# Patient Record
Sex: FEMALE | Race: WHITE | Employment: OTHER | URBAN - METROPOLITAN AREA
[De-identification: names, ages, dates, MRNs, and addresses within clinical notes are randomized per-mention and may not be internally consistent; named-entity substitution may affect disease eponyms.]

---

## 2024-04-19 ENCOUNTER — OFFICE VISIT (OUTPATIENT)
Dept: BARIATRICS/WEIGHT MGMT | Age: 54
End: 2024-04-19
Payer: COMMERCIAL

## 2024-04-19 VITALS
SYSTOLIC BLOOD PRESSURE: 122 MMHG | HEIGHT: 61 IN | HEART RATE: 71 BPM | OXYGEN SATURATION: 96 % | BODY MASS INDEX: 54.71 KG/M2 | RESPIRATION RATE: 16 BRPM | WEIGHT: 289.8 LBS | DIASTOLIC BLOOD PRESSURE: 78 MMHG

## 2024-04-19 DIAGNOSIS — R73.03 PREDIABETES: ICD-10-CM

## 2024-04-19 DIAGNOSIS — E66.01 MORBID OBESITY WITH BMI OF 50.0-59.9, ADULT (HCC): ICD-10-CM

## 2024-04-19 DIAGNOSIS — K21.9 CHRONIC GERD: ICD-10-CM

## 2024-04-19 DIAGNOSIS — Z01.818 PREOPERATIVE CLEARANCE: Primary | ICD-10-CM

## 2024-04-19 DIAGNOSIS — E78.2 MIXED HYPERLIPIDEMIA: ICD-10-CM

## 2024-04-19 DIAGNOSIS — G47.33 OBSTRUCTIVE SLEEP APNEA: ICD-10-CM

## 2024-04-19 PROCEDURE — 3017F COLORECTAL CA SCREEN DOC REV: CPT | Performed by: SURGERY

## 2024-04-19 PROCEDURE — 99205 OFFICE O/P NEW HI 60 MIN: CPT | Performed by: SURGERY

## 2024-04-19 PROCEDURE — 1036F TOBACCO NON-USER: CPT | Performed by: SURGERY

## 2024-04-19 PROCEDURE — G8427 DOCREV CUR MEDS BY ELIG CLIN: HCPCS | Performed by: SURGERY

## 2024-04-19 PROCEDURE — G8417 CALC BMI ABV UP PARAM F/U: HCPCS | Performed by: SURGERY

## 2024-04-19 RX ORDER — TRAMADOL HYDROCHLORIDE 50 MG/1
50 TABLET ORAL EVERY 6 HOURS PRN
COMMUNITY

## 2024-04-19 RX ORDER — ALBUTEROL SULFATE 90 UG/1
2 AEROSOL, METERED RESPIRATORY (INHALATION) EVERY 6 HOURS PRN
COMMUNITY
Start: 2024-02-29 | End: 2024-08-27

## 2024-04-19 RX ORDER — ASPIRIN 81 MG/1
81 TABLET, CHEWABLE ORAL 2 TIMES DAILY
COMMUNITY
Start: 2024-04-03 | End: 2024-05-03

## 2024-04-19 RX ORDER — ASCORBIC ACID 500 MG
500 TABLET ORAL 2 TIMES DAILY
COMMUNITY
Start: 2024-04-03

## 2024-04-19 RX ORDER — IBUPROFEN 800 MG/1
800 TABLET ORAL EVERY 8 HOURS PRN
COMMUNITY
Start: 2024-04-17

## 2024-04-19 RX ORDER — ATORVASTATIN CALCIUM 10 MG/1
10 TABLET, FILM COATED ORAL NIGHTLY
COMMUNITY
Start: 2024-02-29 | End: 2024-08-27

## 2024-04-19 RX ORDER — IBUPROFEN 200 MG
200 TABLET ORAL EVERY 6 HOURS PRN
COMMUNITY

## 2024-04-19 RX ORDER — ACETAMINOPHEN 500 MG
500 TABLET ORAL EVERY 4 HOURS PRN
COMMUNITY
Start: 2024-04-03

## 2024-04-19 RX ORDER — PREDNISONE 10 MG/1
10 TABLET ORAL DAILY
COMMUNITY
Start: 2024-04-03

## 2024-04-19 RX ORDER — POLYETHYLENE GLYCOL 3350, SODIUM CHLORIDE, SODIUM BICARBONATE, POTASSIUM CHLORIDE 420; 11.2; 5.72; 1.48 G/4L; G/4L; G/4L; G/4L
4000 POWDER, FOR SOLUTION ORAL ONCE
COMMUNITY
Start: 2024-03-05

## 2024-04-19 RX ORDER — METHOCARBAMOL 750 MG/1
750 TABLET, FILM COATED ORAL 2 TIMES DAILY PRN
COMMUNITY
Start: 2024-04-03

## 2024-04-19 RX ORDER — GABAPENTIN 300 MG/1
300 TABLET ORAL EVERY 8 HOURS
COMMUNITY
Start: 2024-04-03 | End: 2024-04-24

## 2024-04-19 NOTE — PATIENT INSTRUCTIONS
-Plan for Laparoscopic sleeve gastrectomy      Pre-operative work up Ordered:    - F/U in 4 weeks.   - Nutrition Labs.   - Protein Shake Trial.  - Psych Evaluation.   - Cardiac Clearance.  - Sleep Study & CPAP Compliance. (PENDING)  - EGD (endoscopy to check your stomach).  - Support Group Attendance.   - Obtain letter of medical necessity (PCP Letter).   - Quit Smoking,  Alcohol, Caffeine and Carbonated Drinks  - Obtain records for Weight History 2 yrs.   - Start Regular Exercise and track your activities.   - Start Tracking your food Intake and follow dietary guidelines.   - Avoid Pregnancy for 2 yrs from date of surgery. (for female patients in childbearing age)          Patient advised that its their responsibility to follow up for studies, referrals and/or labs ordered today.

## 2024-04-22 ENCOUNTER — TELEPHONE (OUTPATIENT)
Dept: CARDIOLOGY CLINIC | Age: 54
End: 2024-04-22

## 2024-04-22 NOTE — TELEPHONE ENCOUNTER
Patient was referred by Dr. Jameson Houston to the [facility]  location with Dr. Rose .  Patient has been scheduled for 05/03 at 10:30am (am/pm).  Patient verbalized understanding of appointment instructions.  Call complete.

## 2024-05-01 NOTE — PROGRESS NOTES
Authorizing Provider   atorvastatin (LIPITOR) 10 MG tablet Take 1 tablet by mouth nightly 2/29/24 8/27/24 Yes Reuben Luu MD   aspirin 81 MG chewable tablet Take 1 tablet by mouth 2 times daily 4/3/24 5/3/24 Yes Reuben Luu MD   ascorbic acid (VITAMIN C) 500 MG tablet Take 1 tablet by mouth 2 times daily 4/3/24  Yes Reuben Luu MD   albuterol sulfate HFA (PROVENTIL;VENTOLIN;PROAIR) 108 (90 Base) MCG/ACT inhaler Inhale 2 puffs into the lungs every 6 hours as needed for Wheezing 2/29/24 8/27/24 Yes Reuben Luu MD   acetaminophen (TYLENOL) 500 MG tablet Take 1 tablet by mouth every 4 hours as needed for Pain 4/3/24  Yes Reuben Luu MD   Gabapentin, Once-Daily, 300 MG TABS Take 300 mg by mouth in the morning and 300 mg at noon and 300 mg in the evening. 4/3/24 5/3/24 Yes Reuben Luu MD   ibuprofen (ADVIL;MOTRIN) 200 MG tablet Take 1 tablet by mouth every 6 hours as needed for Pain   Yes Reuben Luu MD   methocarbamol (ROBAXIN) 750 MG tablet Take 1 tablet by mouth 2 times daily as needed 4/3/24  Yes Reuben Luu MD   ibuprofen (ADVIL;MOTRIN) 800 MG tablet Take 1 tablet by mouth every 8 hours as needed for Pain 4/17/24  Yes Reuben Luu MD   predniSONE (DELTASONE) 10 MG tablet Take 1 tablet by mouth daily 4/3/24  Yes Reuben Luu MD   traMADol (ULTRAM) 50 MG tablet Take 1 tablet by mouth every 6 hours as needed for Pain.   Yes Reuben Luu MD   polyethylene glycol-electrolytes (NULYTELY) 420 g solution Take 4,000 mLs by mouth once  Patient not taking: Reported on 5/3/2024 3/5/24   Reuben Luu MD        Current Medications:  Current Outpatient Medications   Medication Sig Dispense Refill    atorvastatin (LIPITOR) 10 MG tablet Take 1 tablet by mouth nightly      aspirin 81 MG chewable tablet Take 1 tablet by mouth 2 times daily      ascorbic acid (VITAMIN C) 500 MG tablet Take 1 tablet by mouth 2 times daily

## 2024-05-03 ENCOUNTER — OFFICE VISIT (OUTPATIENT)
Dept: CARDIOLOGY CLINIC | Age: 54
End: 2024-05-03
Payer: COMMERCIAL

## 2024-05-03 VITALS
OXYGEN SATURATION: 95 % | HEART RATE: 74 BPM | BODY MASS INDEX: 53.52 KG/M2 | DIASTOLIC BLOOD PRESSURE: 62 MMHG | HEIGHT: 61 IN | WEIGHT: 283.5 LBS | SYSTOLIC BLOOD PRESSURE: 100 MMHG

## 2024-05-03 DIAGNOSIS — Z01.818 PREOPERATIVE CLEARANCE: Primary | ICD-10-CM

## 2024-05-03 DIAGNOSIS — R06.02 SOB (SHORTNESS OF BREATH): ICD-10-CM

## 2024-05-03 PROCEDURE — 99204 OFFICE O/P NEW MOD 45 MIN: CPT | Performed by: INTERNAL MEDICINE

## 2024-05-03 PROCEDURE — 93000 ELECTROCARDIOGRAM COMPLETE: CPT | Performed by: INTERNAL MEDICINE

## 2024-05-03 NOTE — PATIENT INSTRUCTIONS
Schedule stress test soon (medicated stress test)    No follow up needed at this time. We will call you will the results of your testing and if further follow up needed we will schedule then

## 2024-05-19 PROBLEM — Z01.818 PREOPERATIVE CLEARANCE: Status: RESOLVED | Noted: 2024-04-19 | Resolved: 2024-05-19

## 2024-05-20 ENCOUNTER — HOSPITAL ENCOUNTER (OUTPATIENT)
Age: 54
Discharge: HOME | End: 2024-05-22
Attending: INTERNAL MEDICINE
Payer: COMMERCIAL

## 2024-05-20 VITALS
SYSTOLIC BLOOD PRESSURE: 112 MMHG | HEART RATE: 70 BPM | WEIGHT: 280 LBS | HEIGHT: 63 IN | BODY MASS INDEX: 49.61 KG/M2 | DIASTOLIC BLOOD PRESSURE: 64 MMHG

## 2024-05-20 VITALS — BODY MASS INDEX: 49.61 KG/M2 | HEIGHT: 63 IN | WEIGHT: 280 LBS

## 2024-05-20 DIAGNOSIS — R06.02 SOB (SHORTNESS OF BREATH): ICD-10-CM

## 2024-05-20 DIAGNOSIS — Z01.818 PREOPERATIVE CLEARANCE: ICD-10-CM

## 2024-05-20 LAB
ECHO BSA: 2.38 M2
NUC STRESS EJECTION FRACTION: 55 %
NUC STRESS LV EDV: 91 ML (ref 56–104)
NUC STRESS LV ESV: 41 ML (ref 19–49)
NUC STRESS LV MASS: 120 G
STRESS BASELINE DIAS BP: 64 MMHG
STRESS BASELINE HR: 71 BPM
STRESS BASELINE ST DEPRESSION: 0 MM
STRESS BASELINE SYS BP: 112 MMHG
STRESS ESTIMATED WORKLOAD: 1 METS
STRESS EXERCISE DUR MIN: 4 MIN
STRESS EXERCISE DUR SEC: 0 SEC
STRESS O2 SAT REST: 95 %
STRESS PEAK DIAS BP: 57 MMHG
STRESS PEAK SYS BP: 113 MMHG
STRESS PERCENT HR ACHIEVED: 51 %
STRESS POST PEAK HR: 87 BPM
STRESS RATE PRESSURE PRODUCT: 9831 BPM*MMHG
STRESS ST DEPRESSION: 0 MM
STRESS TARGET HR: 169 BPM
TID: 1.04

## 2024-05-20 PROCEDURE — 93016 CV STRESS TEST SUPVJ ONLY: CPT | Performed by: INTERNAL MEDICINE

## 2024-05-20 PROCEDURE — 78452 HT MUSCLE IMAGE SPECT MULT: CPT

## 2024-05-20 PROCEDURE — 6360000002 HC RX W HCPCS: Performed by: INTERNAL MEDICINE

## 2024-05-20 PROCEDURE — 78452 HT MUSCLE IMAGE SPECT MULT: CPT | Performed by: INTERNAL MEDICINE

## 2024-05-20 PROCEDURE — 93017 CV STRESS TEST TRACING ONLY: CPT

## 2024-05-20 PROCEDURE — 93018 CV STRESS TEST I&R ONLY: CPT | Performed by: INTERNAL MEDICINE

## 2024-05-20 RX ORDER — REGADENOSON 0.08 MG/ML
0.4 INJECTION, SOLUTION INTRAVENOUS
Status: COMPLETED | OUTPATIENT
Start: 2024-05-20 | End: 2024-05-20

## 2024-05-20 RX ADMIN — REGADENOSON 0.4 MG: 0.08 INJECTION, SOLUTION INTRAVENOUS at 14:18

## 2024-05-28 ENCOUNTER — TELEPHONE (OUTPATIENT)
Dept: CARDIOLOGY CLINIC | Age: 54
End: 2024-05-28

## 2024-05-28 NOTE — TELEPHONE ENCOUNTER
----- Message from Jose D Rose MD sent at 5/26/2024 10:14 AM EDT -----  Please inform patient of normal stress test result

## 2024-07-16 ENCOUNTER — OFFICE VISIT (OUTPATIENT)
Dept: BARIATRICS/WEIGHT MGMT | Age: 54
End: 2024-07-16
Payer: COMMERCIAL

## 2024-07-16 VITALS
WEIGHT: 293 LBS | HEART RATE: 70 BPM | HEIGHT: 61 IN | DIASTOLIC BLOOD PRESSURE: 62 MMHG | BODY MASS INDEX: 55.32 KG/M2 | RESPIRATION RATE: 16 BRPM | OXYGEN SATURATION: 98 % | SYSTOLIC BLOOD PRESSURE: 114 MMHG

## 2024-07-16 DIAGNOSIS — E78.2 MIXED HYPERLIPIDEMIA: ICD-10-CM

## 2024-07-16 DIAGNOSIS — R73.03 PREDIABETES: ICD-10-CM

## 2024-07-16 DIAGNOSIS — G47.33 OBSTRUCTIVE SLEEP APNEA: ICD-10-CM

## 2024-07-16 DIAGNOSIS — K21.9 CHRONIC GERD: ICD-10-CM

## 2024-07-16 DIAGNOSIS — E66.01 MORBID OBESITY WITH BMI OF 50.0-59.9, ADULT (HCC): Primary | ICD-10-CM

## 2024-07-16 PROCEDURE — G8417 CALC BMI ABV UP PARAM F/U: HCPCS | Performed by: SURGERY

## 2024-07-16 PROCEDURE — 3017F COLORECTAL CA SCREEN DOC REV: CPT | Performed by: SURGERY

## 2024-07-16 PROCEDURE — 99214 OFFICE O/P EST MOD 30 MIN: CPT | Performed by: SURGERY

## 2024-07-16 PROCEDURE — 1036F TOBACCO NON-USER: CPT | Performed by: SURGERY

## 2024-07-16 PROCEDURE — G8427 DOCREV CUR MEDS BY ELIG CLIN: HCPCS | Performed by: SURGERY

## 2024-07-16 NOTE — PROGRESS NOTES
[facility]   [facility]  Jameson Houston MD, FACS, [address], [address]    [facility] [address] .  Phone: [phone]  Fax: [phone]          Chief Complaint   Patient presents with    Obesity     2nd pre-surg         HPI:     Tiff Baez is a very pleasant 53 y.o. female with Body mass index is 55.74 kg/m². / Chronic Obesity.     Tiff has been struggling for several years now with obesity. Tiff feels the weight is an obstacle to achieve and perform things in daily living as well risk on health.     Patient  is very determined to lose weight and be healthy, and is working towards  surgical weight loss to achieve this goal. Pre-operative clearance and work up pending. Working hard to keep good dietary habits as well level of activity.  Patient denies any nausea, vomiting, fevers, chills, shortness of breath, chest pain, cough, constipation or difficulty urinating.    Pain Assessment   Denies any abdominal pain       Past Medical History:   Diagnosis Date    Arthritis     Back pain     COPD (chronic obstructive pulmonary disease) (HCC)     Hyperlipidemia      Past Surgical History:   Procedure Laterality Date    TOTAL KNEE ARTHROPLASTY Bilateral 2024     Family History   Problem Relation Age of Onset    Arthritis Mother     Stroke Mother     Diabetes Father     Elevated Lipids Father     Hypertension Father      Social History     Tobacco Use    Smoking status: Former     Current packs/day: 0.00     Average packs/day: 0.5 packs/day for 37.3 years (18.6 ttl pk-yrs)     Types: Cigarettes     Start date:      Quit date: 2024     Years since quittin.2    Smokeless tobacco: Never   Substance Use Topics    Alcohol use: Not on file     I counseled the patient on the importance of not smoking and risks of ETOH.   No Known Allergies  Vitals:    24 1117   BP: 114/62   Pulse: 70   Resp: 16   SpO2: 98%   Weight: 133.8 kg (295 lb)   Height: 1.549 m

## 2024-07-16 NOTE — PROGRESS NOTES
Tiff Baez gained 5.2 lbs over past 3 months. Pt reports she currently lives in a sober community where food is all brought in and everyone cooks so has little control over what is offered. However, pt is about to move into her own apartment. Pt reports she is also working with a Dietitian at her doctors office.     Breakfast: cornflakes with dried strawberries OR oatmeal (made with 2% milk) with 2 slices of toast     Snack: nothing     Lunch: bologna sandwich with cheese on wheat with chips / CC     Snack: nothing     Dinner: chicken with veggies (mixed veggies) and mashed potatoes/fried potatoes     Snack: another bowl of cereal OR yogurt     Is pt consuming smaller portions? Patient     Is pt consuming at least 64 oz of fluids per day? Yes     Is pt consuming carbonated, caffeinated, or sugary beverages? Yes - Drinks water, water flavorings, gatorade (regular or zero), caffeinated coffee and splenda, no sweet tea since last visit, eliminated soda     Has pt sampled Unjury and/or Nectar protein? Not yet, discussed today, reports no income so pt is not sure how she can afford - discussed expectations of cost for preop class and first few weeks after sx      Has patient attended a support group? Not yet, discussed today     Exercise: Pt had knee replacement in April 2024 and currently using walker and completed physical therapy     Plan/Recommendations:   Decrease CHO intake and focus on protein with fruit/veggies   Decrease portions   Focus on protein based snacks   Try protein powder   Attend SG     Handouts: none     Rebeca Murray, RD, LD

## 2024-07-26 ENCOUNTER — PREP FOR PROCEDURE (OUTPATIENT)
Dept: BARIATRICS/WEIGHT MGMT | Age: 54
End: 2024-07-26

## 2024-08-12 RX ORDER — SODIUM CHLORIDE 0.9 % (FLUSH) 0.9 %
5-40 SYRINGE (ML) INJECTION PRN
Status: CANCELLED | OUTPATIENT
Start: 2024-08-12

## 2024-08-12 RX ORDER — SODIUM CHLORIDE 9 MG/ML
25 INJECTION, SOLUTION INTRAVENOUS PRN
Status: CANCELLED | OUTPATIENT
Start: 2024-08-12

## 2024-08-12 RX ORDER — SODIUM CHLORIDE 0.9 % (FLUSH) 0.9 %
5-40 SYRINGE (ML) INJECTION EVERY 12 HOURS SCHEDULED
Status: CANCELLED | OUTPATIENT
Start: 2024-08-12

## 2024-08-27 ENCOUNTER — OFFICE VISIT (OUTPATIENT)
Dept: BARIATRICS/WEIGHT MGMT | Age: 54
End: 2024-08-27
Payer: COMMERCIAL

## 2024-08-27 ENCOUNTER — INITIAL CONSULT (OUTPATIENT)
Dept: BARIATRICS/WEIGHT MGMT | Age: 54
End: 2024-08-27
Payer: COMMERCIAL

## 2024-08-27 VITALS
BODY MASS INDEX: 55.32 KG/M2 | HEIGHT: 61 IN | OXYGEN SATURATION: 94 % | HEART RATE: 68 BPM | DIASTOLIC BLOOD PRESSURE: 75 MMHG | WEIGHT: 293 LBS | SYSTOLIC BLOOD PRESSURE: 119 MMHG | RESPIRATION RATE: 17 BRPM

## 2024-08-27 DIAGNOSIS — F33.41 MDD (MAJOR DEPRESSIVE DISORDER), RECURRENT, IN PARTIAL REMISSION (HCC): Primary | ICD-10-CM

## 2024-08-27 DIAGNOSIS — E66.01 MORBID OBESITY WITH BMI OF 50.0-59.9, ADULT (HCC): Primary | ICD-10-CM

## 2024-08-27 DIAGNOSIS — F10.21 ALCOHOL DEPENDENCE, IN REMISSION (HCC): ICD-10-CM

## 2024-08-27 DIAGNOSIS — R73.03 PREDIABETES: ICD-10-CM

## 2024-08-27 DIAGNOSIS — E66.01 MORBID OBESITY WITH BMI OF 50.0-59.9, ADULT (HCC): ICD-10-CM

## 2024-08-27 DIAGNOSIS — K21.9 CHRONIC GERD: ICD-10-CM

## 2024-08-27 DIAGNOSIS — G47.33 OBSTRUCTIVE SLEEP APNEA: ICD-10-CM

## 2024-08-27 DIAGNOSIS — E78.2 MIXED HYPERLIPIDEMIA: ICD-10-CM

## 2024-08-27 PROCEDURE — G8417 CALC BMI ABV UP PARAM F/U: HCPCS | Performed by: SURGERY

## 2024-08-27 PROCEDURE — 3017F COLORECTAL CA SCREEN DOC REV: CPT | Performed by: SURGERY

## 2024-08-27 PROCEDURE — 99214 OFFICE O/P EST MOD 30 MIN: CPT | Performed by: SURGERY

## 2024-08-27 PROCEDURE — 90791 PSYCH DIAGNOSTIC EVALUATION: CPT | Performed by: PSYCHOLOGIST

## 2024-08-27 PROCEDURE — 1036F TOBACCO NON-USER: CPT | Performed by: SURGERY

## 2024-08-27 PROCEDURE — G8427 DOCREV CUR MEDS BY ELIG CLIN: HCPCS | Performed by: SURGERY

## 2024-08-27 PROCEDURE — 96130 PSYCL TST EVAL PHYS/QHP 1ST: CPT | Performed by: PSYCHOLOGIST

## 2024-08-27 PROCEDURE — 1036F TOBACCO NON-USER: CPT | Performed by: PSYCHOLOGIST

## 2024-08-27 PROCEDURE — 96136 PSYCL/NRPSYC TST PHY/QHP 1ST: CPT | Performed by: PSYCHOLOGIST

## 2024-08-27 RX ORDER — BUDESONIDE AND FORMOTEROL FUMARATE DIHYDRATE 160; 4.5 UG/1; UG/1
2 AEROSOL RESPIRATORY (INHALATION) 2 TIMES DAILY
COMMUNITY

## 2024-08-27 RX ORDER — ESCITALOPRAM OXALATE 10 MG/1
TABLET ORAL
COMMUNITY
Start: 2024-08-19

## 2024-08-27 NOTE — PROGRESS NOTES
[facility]   [facility]  Jameson Houston MD, FACS, [address], [address]    [facility] [address] .  Phone: [phone]  Fax: [phone]          Chief Complaint   Patient presents with    Bariatric, Initial Visit     3rd pre surg         HPI:     Tiff Baez is a very pleasant 53 y.o. female with Body mass index is 56.31 kg/m². / Chronic Obesity.     Tiff has been struggling for several years now with obesity. Tiff feels the weight is an obstacle to achieve and perform things in daily living as well risk on health.     Patient  is very determined to lose weight and be healthy, and is working towards  surgical weight loss to achieve this goal. Pre-operative clearance and work up pending. Working hard to keep good dietary habits as well level of activity.  Patient denies any nausea, vomiting, fevers, chills, shortness of breath, chest pain, cough, constipation or difficulty urinating.    Pain Assessment   Denies any abdominal pain       Past Medical History:   Diagnosis Date    Arthritis     Back pain     COPD (chronic obstructive pulmonary disease) (HCC)     Hyperlipidemia      Past Surgical History:   Procedure Laterality Date    TOTAL KNEE ARTHROPLASTY Bilateral 2024     Family History   Problem Relation Age of Onset    Arthritis Mother     Stroke Mother     Diabetes Father     Elevated Lipids Father     Hypertension Father      Social History     Tobacco Use    Smoking status: Former     Current packs/day: 0.00     Average packs/day: 0.5 packs/day for 37.3 years (18.6 ttl pk-yrs)     Types: Cigarettes     Start date:      Quit date: 2024     Years since quittin.3    Smokeless tobacco: Never   Substance Use Topics    Alcohol use: Not on file     I counseled the patient on the importance of not smoking and risks of ETOH.   No Known Allergies  Vitals:    24 1046   BP: 119/75   Site: Left Upper Arm   Position: Sitting   Pulse: 68   Resp:

## 2024-08-27 NOTE — PROGRESS NOTES
Tiff Baez gained 3 lbs over past 5 weeks. Pt just moved into her own apartment. Pt reports she is not sleeping good at night - gets tired and takes 1-4 naps during the day. Pt reported she stopped smoking but then started vaping. Pt typically goes to the grocery store 1-2 x month - reports still very limited with finances.     Is pt eating at least 4 times everyday? Eating 3 x day   B - pork sausage and 2 HB eggs   *takes a nap around 12-1 and sleeps for 3 hours  L - bologna sandwich with some chips OR CC   *back to bed and wakes up around 9-10 p.m. and eats another bologna sandwich and chips     Is pt eating a lean protein source with all meals and snacks? Always having protein but not lean     Has pt decreased their portions using the plate method? Portions could decrease further     Is pt choosing low fat/sugar free options? No     Is pt drinking at least 64 oz of clear liquids everyday? Yes      Has pt stopped drinking carbonation, caffeinated, and sugar sweetened beverages? Drinks water, water flavorings, decaf coffee and splenda, eliminated soda and gatorade and sweet tea     Has pt sampled Unjury and/or Nectar protein? Not yet, reports still no income so pt is not sure how she can afford - reviewed price of samples would be only $5 for 2 packets, but that preop class would be a more expensive cost     Has pt attended a support group? Not yet, discussed today     Participating in intentional exercise? Pt limited as she has osteoarthritis and breathing issues. Pt had knee replacement in April 2024 and currently using walker - but states her knee is improving.     Plan/Recommendations:   Eat 4 x day   Switch to lower fat fat food options - avoid sausage / bologna and stick with leaner options   Avoid chips and increase veggie intake   Attend SG   Try protein powder   Discussed savvy shopping techniques     Handouts: presurgical diet eating guide, budget friendly foods     Rebeca Murray RD, LD     Bilobed Flap Text: The defect edges were debeveled with a #15c scalpel blade.  Given the location of the defect and the proximity to free margins a bilobe flap was deemed most appropriate.  Using a sterile surgical marker, an appropriate bilobe flap drawn around the defect.    The area thus outlined was incised deep to adipose tissue with a #15 scalpel blade.  The skin margins were undermined to an appropriate distance in all directions utilizing iris scissors.

## 2024-08-27 NOTE — PROGRESS NOTES
Tiff Baez presented for her presurgical psychological evaluation on 08/27/2024. The patient is pursuing bariatric surgery secondary to a medical diagnosis of morbid obesity. The evaluation consisted of a clinical interview, the Eating Habits Checklist, the Binge Eating Disorder Screener - 7 (BEDS-7), and the Symptom Hugdmlfky-25-A (SCL-90-R) administered by the provider.    Based on the evaluation, Tiff Baez is considered to be an appropriate candidate for bariatric surgery from a psychological standpoint, provided she maintains adequate management of her depression, and demonstrates the ability to effectively implement and sustain presurgical dietary recommendations. She acknowledges a longstanding history of recurrent, intermittent depression that was largely untreated until recently. She has taken Lexapro prescribed by her family physician for the past 6-9 months with good therapeutic benefit. She states her depressive symptoms are largely in remission with the medication, though she continues to struggle with some grief from her daughter's death in 2022. She notes she participated in counseling through [facility] for several months earlier this year. She reports no other history of psychological intervention. She states she was prescribed Zoloft 10-11 years ago, but discontinued the medication after a month or two due to side effects. She does not recall ever having taken any other psychotropic medications other than the Lexapro she is currently prescribed. She denies a history of severe depression, including suicidal ideation or suicide attempts. She has never been hospitalized psychiatrically. She acknowledges a history of nicotine and alcohol dependence. She states she smoked 1/2 ppd for 30+ years prior to quitting in April 2024. She is not yet completely nicotine-free, as she continues to vape \"here and there.\" She states she has been sober for three years. She notes she quit drinking without  psychological standpoint, provided she maintains adequate management of her depression, and demonstrates the ability to effectively implement and sustain presurgical dietary recommendations. She was provided with the following interactive feedback: She was encouraged to participate in support group activities through [facility], and to consult with our staff and her PCP should she experience any significant post-surgical mood changes or have difficulty modifying her eating behaviors. Feel free to consult with me as needed with any further questions regarding this evaluation.    Provider spent 25 minutes in test administration services. Provider spent 65 minutes in test evaluation services on 08/27/2024.

## 2024-09-11 NOTE — PROGRESS NOTES
[facility]   [facility]    Subjective:      Patient ID: Tiff Baez is a 54 y.o. female    HPI    The patient is here for their bariatric surgery presurgical visit for future weight loss. She has made several attempts at weight loss in the past without success and now wishes to pursue bariatric surgery. She is working to change her dietary behaviors and lose weight to improve comorbid conditions such as prediabetes, hyperlipidemia, obstructive sleep apnea and GERD.    Tiff Baez is a 54 y.o. female with Body mass index is 54.98 kg/m².    Past Medical History:   Diagnosis Date    Arthritis     Back pain     COPD (chronic obstructive pulmonary disease) ([facility])     Hyperlipidemia      Past Surgical History:   Procedure Laterality Date    TOTAL KNEE ARTHROPLASTY Bilateral 2024     Family History   Problem Relation Age of Onset    Arthritis Mother     Stroke Mother     Diabetes Father     Elevated Lipids Father     Hypertension Father      Social History     Tobacco Use    Smoking status: Former     Current packs/day: 0.00     Average packs/day: 0.5 packs/day for 37.3 years (18.6 ttl pk-yrs)     Types: Cigarettes     Start date:      Quit date: 2024     Years since quittin.4    Smokeless tobacco: Never   Substance Use Topics    Alcohol use: Not on file     I counseled the patient on the importance of not smoking and risks of ETOH.   No Known Allergies  Vitals:    24 1410   BP: 115/73   Pulse: 78   Weight: 132 kg (291 lb)   Height: 1.549 m (5' 1\")     Body mass index is 54.98 kg/m².    Current Outpatient Medications:     escitalopram (LEXAPRO) 10 MG tablet, , Disp: , Rfl:     budesonide-formoterol (SYMBICORT) 160-4.5 MCG/ACT AERO, Inhale 2 puffs into the lungs 2 times daily, Disp: , Rfl:     ascorbic acid (VITAMIN C) 500 MG tablet, Take 1 tablet by mouth 2 times daily, Disp: , Rfl:     acetaminophen (TYLENOL) 500 MG tablet, Take 1 tablet by mouth every 4 hours as

## 2024-09-30 ENCOUNTER — PREP FOR PROCEDURE (OUTPATIENT)
Dept: BARIATRICS/WEIGHT MGMT | Age: 54
End: 2024-09-30

## 2024-09-30 ENCOUNTER — OFFICE VISIT (OUTPATIENT)
Dept: BARIATRICS/WEIGHT MGMT | Age: 54
End: 2024-09-30
Payer: COMMERCIAL

## 2024-09-30 VITALS
BODY MASS INDEX: 54.94 KG/M2 | HEART RATE: 78 BPM | WEIGHT: 291 LBS | DIASTOLIC BLOOD PRESSURE: 73 MMHG | SYSTOLIC BLOOD PRESSURE: 115 MMHG | HEIGHT: 61 IN

## 2024-09-30 DIAGNOSIS — K21.9 CHRONIC GERD: Primary | ICD-10-CM

## 2024-09-30 DIAGNOSIS — G47.33 OBSTRUCTIVE SLEEP APNEA: ICD-10-CM

## 2024-09-30 DIAGNOSIS — E66.01 MORBID OBESITY WITH BMI OF 50.0-59.9, ADULT: ICD-10-CM

## 2024-09-30 DIAGNOSIS — E78.2 MIXED HYPERLIPIDEMIA: ICD-10-CM

## 2024-09-30 DIAGNOSIS — R73.03 PREDIABETES: ICD-10-CM

## 2024-09-30 PROCEDURE — 3017F COLORECTAL CA SCREEN DOC REV: CPT | Performed by: NURSE PRACTITIONER

## 2024-09-30 PROCEDURE — G8427 DOCREV CUR MEDS BY ELIG CLIN: HCPCS | Performed by: NURSE PRACTITIONER

## 2024-09-30 PROCEDURE — 1036F TOBACCO NON-USER: CPT | Performed by: NURSE PRACTITIONER

## 2024-09-30 PROCEDURE — 99214 OFFICE O/P EST MOD 30 MIN: CPT | Performed by: NURSE PRACTITIONER

## 2024-09-30 PROCEDURE — G2211 COMPLEX E/M VISIT ADD ON: HCPCS | Performed by: NURSE PRACTITIONER

## 2024-09-30 PROCEDURE — G8417 CALC BMI ABV UP PARAM F/U: HCPCS | Performed by: NURSE PRACTITIONER

## 2024-09-30 RX ORDER — SODIUM CHLORIDE 9 MG/ML
25 INJECTION, SOLUTION INTRAVENOUS PRN
Status: CANCELLED | OUTPATIENT
Start: 2024-09-30

## 2024-09-30 RX ORDER — SODIUM CHLORIDE 0.9 % (FLUSH) 0.9 %
5-40 SYRINGE (ML) INJECTION EVERY 12 HOURS SCHEDULED
Status: CANCELLED | OUTPATIENT
Start: 2024-09-30

## 2024-09-30 RX ORDER — SODIUM CHLORIDE 0.9 % (FLUSH) 0.9 %
5-40 SYRINGE (ML) INJECTION PRN
Status: CANCELLED | OUTPATIENT
Start: 2024-09-30

## 2024-09-30 NOTE — PATIENT INSTRUCTIONS
in the future it is important to understand, as a bariatric surgery patient, there is a risk of it negatively affecting your weight loss goals and it can pose a risk for addiction.    You should be reducing added fat and sugar in your diet.  Frying foods adds too much fat and calories, but you could use an air fryer as it requires significantly less oil. Baking, broiling, or grilling meats add flavor without unhealthy fats. Using cooking oil spray and spray butter products are also healthy options that will aid in your weight loss. Foods high in added sugars are often also high in calories and low in nutrients.      If you are a smoker or use e-cigarettes/vaping, you must eliminate.  You must be nicotine free and/or not vaping for at least 8 weeks before your surgery can be scheduled. You will be screened for nicotine through lab work.     Eating habits after surgery need to be a long-term change. Eating habits are often so ingrained that it can be difficult to change. It is important to practice new eating habits prior to surgery to mentally prepare yourself for the challenge ahead. Also, remember that overall health, age, and genetics make each person's weight loss progress different. Do not compare your progress (pre- or post-operatively), the amount you eat, or your exercise to other patients.      In addition, it is the responsibility of the patient to schedule and follow up on labs and tests completed during the pre-surgical period. Results will be reviewed at each visit.    **IT IS IMPORTANT TO KNOW THAT YOU MUST COMPLETE ALL REQUIRED DIETARY CHANGES, TESTS, CLEARANCES AND ACTIVITIES BEFORE A SURGERY DATE CAN BE GIVEN. IF YOU HAVE NOT MET ANY OF THESE REQUIREMENTS THEN YOU WILL NOT BE GIVEN A SURGERY DATE UNTIL THEY HAVE BEEN MET TO OUR SATISFACTION. THIS IS NOT ONLY DONE TO HELP YOU BE SUCCESSFUL AFTER SURGERY, BUT TO BE SAFE AS WELL.**    Patient received dietary handouts and education.

## 2024-09-30 NOTE — PROGRESS NOTES
Tiff Baez lost 7 lbs over past month. Pt reports she has really focused this month on utilizing handouts provided and has been making better choices / working on portions / reducing snacking.     Is pt eating at least 4 times everyday? Eating 3 x day     Is pt eating a lean protein source with all meals and snacks? Yes   B - scrambled eggs with some egg whites with turkey sausage with some OJ   L - CC sometimes with yogurt   D - baked chicken with corn/green beans     Has pt decreased their portions using the plate method? Pt feels portions have improved     Is pt choosing low fat/sugar free options? Yes     Is pt drinking at least 64 oz of clear liquids everyday? Yes     Has pt stopped drinking carbonation, caffeinated, and sugar sweetened beverages? No - drinks light OJ (80 calorie), water, noncaffeinated tea with splenda, occasional decaf coffee with splenda     Has pt sampled Unjury and/or Nectar protein? Not yet, discussed today     Has pt attended a support group? Not yet, discussed today - signed up for 11/7 group     Participating in intentional exercise? Yes - pt limited as she has osteoarthritis and breathing issues. Has been doing bed exercises and leg lifts, sometimes takes extra laps around the living room. No longer as reliant on walker and is using her cane more.     Plan/Recommendations:   Include at least one protein based snack so she is eating at least 4 x day   Avoid lower calorie OJ / stick to diet (5 calorie) juice options   Try protein powder   Attend SG 11/7/24 - pt signed up today     Handouts: none     Rebeca Murray, RD, LD

## 2024-10-04 NOTE — PROGRESS NOTES
Patient Reached:  Yes_X__   No___    Voicemail Instructions Given: Yes___  No___  # Called:       Date: 10/ 11 /2024      *Arrival Time Per Office      Location: [address] [place], [place]       -Please follow a clear liquid diet the entire day before the procedure.    -No liquids after midnight. Including no gum, candy or mints.    -Do not take any medications the day of the procedure.    -Follow all instructions that the office has given you.    -You will need a responsible adult to stay on site, and take you home after the procedure.    -Bring a complete list of all your medications and supplements that you currently take. Please include the name and dose of each.    -Dress comfortably.    -Bring Insurance Card and Photo ID.    -Any questions or concerns call Dr. Houston's office at [phone]      -Other:                VISITOR POLICY(subject to change):    The current policy is 2 visitors per patient.There are no children allowed.Mask at discretion of facility. Visiting hours are 8a-8p.Overnight visitors will be at the discretion of the nurse. All policies are subject to change.

## 2024-10-07 ENCOUNTER — TELEPHONE (OUTPATIENT)
Dept: BARIATRICS/WEIGHT MGMT | Age: 54
End: 2024-10-07

## 2024-10-08 NOTE — PROGRESS NOTES
[facility]   [facility]    Subjective:      Patient ID: Tiff Baez is a 54 y.o. female    HPI    The patient is here for their bariatric surgery presurgical visit for future weight loss. She has made several attempts at weight loss in the past without success and now wishes to pursue bariatric surgery. She is working to change her dietary behaviors and lose weight to improve comorbid conditions such as prediabetes, hyperlipidemia, obstructive sleep apnea and GERD.    Tiff Baez is a 54 y.o. female with Body mass index is 54.61 kg/m².    Past Medical History:   Diagnosis Date    Arthritis     Back pain     COPD (chronic obstructive pulmonary disease) (HCC)     Hyperlipidemia      Past Surgical History:   Procedure Laterality Date    ABLATION COLPOCLESIS      CARPAL TUNNEL RELEASE Bilateral     TOTAL KNEE ARTHROPLASTY Right 2024    UPPER GASTROINTESTINAL ENDOSCOPY N/A 10/11/2024    ESOPHAGOGASTRODUODENOSCOPY BIOPSY performed by Jameson Houston MD at [facility] ENDOSCOPY     Family History   Problem Relation Age of Onset    Arthritis Mother     Stroke Mother     Diabetes Father     Elevated Lipids Father     Hypertension Father      Social History     Tobacco Use    Smoking status: Former     Current packs/day: 0.00     Average packs/day: 0.5 packs/day for 37.3 years (18.6 ttl pk-yrs)     Types: Cigarettes     Start date:      Quit date: 2024     Years since quittin.5    Smokeless tobacco: Never   Substance Use Topics    Alcohol use: Not Currently     I counseled the patient on the importance of not smoking and risks of ETOH.   No Known Allergies  Vitals:    10/28/24 1106   BP: 96/68   Pulse: 72   Weight: 131.1 kg (289 lb)   Height: 1.549 m (5' 1\")     Body mass index is 54.61 kg/m².    Current Outpatient Medications:     metroNIDAZOLE (FLAGYL) 500 MG tablet, Take 1 tablet by mouth 3 times daily for 14 days, Disp: 42 tablet, Rfl: 0    doxycycline hyclate

## 2024-10-08 NOTE — PATIENT INSTRUCTIONS
Goals in preparing for bariatric surgery  You should be giving up all beverages that have carbonation, sugar, and caffeine (Refer to the approved liquids list provided at initial visit).  You should be drinking 64 ounces of low calorie (5 calories or less per serving) fluids per day. Suggestions include:  Water (you may add fresh lemon or lime)  Crystal Light  [place] Liquid Water Enhancer  Propel Zero  Powerade Zero/Gatorade Zero  Isopure  Alvhl4O  SOBE Lifewater Zero  Vitamin Water Zero  Sugar Free Seamus-Aid  You should be eating 4-6 times per day.  Three small meals plus 1-2 snacks per day is your goal. This balances your calories and nutrients evenly throughout the day and helps to boost your metabolism. Refer to the snack list provided at your initial visit. Aim for a protein at every snack, plus a fruit, vegetable or starch.  You should be eating protein at every meal and snack.  Protein is typically found in animal sources, i.e. chicken, lean beef, lean pork, fish, seafood and eggs. It is also found in low-fat dairy sources such as skim or 1% milk, low-fat yogurt, low-fat cheese, and low-fat cottage cheese. Plant based sources of protein include peanut butter, beans, and soy.  You should be utilizing the 9-inch plate method.  Eating on a smaller plate will help you control portion size, but what you put on your plate counts also. Make ¼ of your plate lean protein, ¼ carbohydrate (fruit, grain or starchy vegetables) and ½ the plate non-starchy vegetables.  You should eliminate caffeine.  Caffeine is dehydrating. After surgery, it's very important to stay hydrated. Giving up caffeine before surgery will help you focus on the changes necessary to be successful after surgery. There are many decaffeinated coffee and tea products available in grocery stores.    You should eliminate alcohol.  You must abstain from alcohol prior to surgery and for at least the first 6-9 months after surgery. Although you may have alcohol

## 2024-10-10 NOTE — DISCHARGE INSTRUCTIONS
ENDOSCOPY DISCHARGE INSTRUCTIONS    You may experience some lightheadedness for the next several hours.  Plan on quiet relaxation for the rest of today.  A responsible adult needs to stay with you today.  Because of the medications you received today-do not drive,operate machinery,or sign any contractual agreement for the next 24 hours.  Do not drink any alcoholic beverages or take any unprescribed medications tonight.  Eat bland food and avoid anything greasy or spicy initially-progress to your normal diet gradually.  Diet restrictions as instructed.  If you have any of the following problems, notify your physician or return to the hospital emergency room : fever, chills, excessive bleeding, excessive vomiting, difficulty swallowing, uncontrolled pain, increased abdominal distention, shortness of breath or any other problems.  If you have a sore throat, you may use lozenges or salt water gargles.  Please call Dr. Houston's office in 5 business days for biopsy results [phone].      ANESTHESIA DISCHARGE INSTRUCTIONS    Wear your seatbelt home.  You are under the influence of drugs-do not drink alcohol,drive,operate machinery,or make any important decisions or sign any legal documentsfor 24 hours  A responsible adult needs to be with you for 24 hours.  You may experience lightheadedness,dizziness,or sleepiness following surgery.  Rest at home today- increase activity as tolerated.  Progress slowly to a regular diet unless your physician has instructed you otherwise.Drink plenty of water.  If nausea becomes a problem call your physician.  Coughing,sore throat,and muscle aches are other side effects of anesthesia,and should improve with time.  Do not drive,operate machinery while taking narcotics.

## 2024-10-11 ENCOUNTER — ANESTHESIA EVENT (OUTPATIENT)
Dept: ENDOSCOPY | Age: 54
End: 2024-10-11
Payer: COMMERCIAL

## 2024-10-11 ENCOUNTER — HOSPITAL ENCOUNTER (OUTPATIENT)
Age: 54
Setting detail: OUTPATIENT SURGERY
Discharge: HOME | End: 2024-10-11
Attending: SURGERY | Admitting: SURGERY
Payer: COMMERCIAL

## 2024-10-11 ENCOUNTER — APPOINTMENT (OUTPATIENT)
Dept: ENDOSCOPY | Age: 54
End: 2024-10-11
Attending: SURGERY
Payer: COMMERCIAL

## 2024-10-11 ENCOUNTER — ANESTHESIA (OUTPATIENT)
Dept: ENDOSCOPY | Age: 54
End: 2024-10-11
Payer: COMMERCIAL

## 2024-10-11 VITALS
DIASTOLIC BLOOD PRESSURE: 63 MMHG | BODY MASS INDEX: 51.01 KG/M2 | TEMPERATURE: 97.8 F | SYSTOLIC BLOOD PRESSURE: 110 MMHG | OXYGEN SATURATION: 99 % | RESPIRATION RATE: 22 BRPM | HEIGHT: 63 IN | WEIGHT: 287.9 LBS | HEART RATE: 64 BPM

## 2024-10-11 DIAGNOSIS — K21.9 CHRONIC GERD: ICD-10-CM

## 2024-10-11 LAB
GLUCOSE BLD-MCNC: 116 MG/DL (ref 70–99)
HCG UR QL: NEGATIVE
PERFORMED ON: ABNORMAL

## 2024-10-11 PROCEDURE — 2580000003 HC RX 258: Performed by: SURGERY

## 2024-10-11 PROCEDURE — 88305 TISSUE EXAM BY PATHOLOGIST: CPT

## 2024-10-11 PROCEDURE — 7100000000 HC PACU RECOVERY - FIRST 15 MIN: Performed by: SURGERY

## 2024-10-11 PROCEDURE — 7100000001 HC PACU RECOVERY - ADDTL 15 MIN: Performed by: SURGERY

## 2024-10-11 PROCEDURE — 84703 CHORIONIC GONADOTROPIN ASSAY: CPT

## 2024-10-11 PROCEDURE — 6360000002 HC RX W HCPCS: Performed by: NURSE ANESTHETIST, CERTIFIED REGISTERED

## 2024-10-11 PROCEDURE — 3609012400 HC EGD TRANSORAL BIOPSY SINGLE/MULTIPLE: Performed by: SURGERY

## 2024-10-11 PROCEDURE — 2500000003 HC RX 250 WO HCPCS: Performed by: NURSE ANESTHETIST, CERTIFIED REGISTERED

## 2024-10-11 PROCEDURE — 7100000010 HC PHASE II RECOVERY - FIRST 15 MIN: Performed by: SURGERY

## 2024-10-11 PROCEDURE — 43239 EGD BIOPSY SINGLE/MULTIPLE: CPT | Performed by: SURGERY

## 2024-10-11 PROCEDURE — 7100000011 HC PHASE II RECOVERY - ADDTL 15 MIN: Performed by: SURGERY

## 2024-10-11 PROCEDURE — 3700000000 HC ANESTHESIA ATTENDED CARE: Performed by: SURGERY

## 2024-10-11 PROCEDURE — 2709999900 HC NON-CHARGEABLE SUPPLY: Performed by: SURGERY

## 2024-10-11 RX ORDER — LIDOCAINE HYDROCHLORIDE 20 MG/ML
INJECTION, SOLUTION INFILTRATION; PERINEURAL
Status: DISCONTINUED | OUTPATIENT
Start: 2024-10-11 | End: 2024-10-11 | Stop reason: SDUPTHER

## 2024-10-11 RX ORDER — SODIUM CHLORIDE 0.9 % (FLUSH) 0.9 %
5-40 SYRINGE (ML) INJECTION EVERY 12 HOURS SCHEDULED
Status: DISCONTINUED | OUTPATIENT
Start: 2024-10-11 | End: 2024-10-11 | Stop reason: HOSPADM

## 2024-10-11 RX ORDER — SODIUM CHLORIDE 0.9 % (FLUSH) 0.9 %
5-40 SYRINGE (ML) INJECTION PRN
Status: DISCONTINUED | OUTPATIENT
Start: 2024-10-11 | End: 2024-10-11 | Stop reason: HOSPADM

## 2024-10-11 RX ORDER — SODIUM CHLORIDE 9 MG/ML
25 INJECTION, SOLUTION INTRAVENOUS PRN
Status: DISCONTINUED | OUTPATIENT
Start: 2024-10-11 | End: 2024-10-11 | Stop reason: HOSPADM

## 2024-10-11 RX ORDER — PROPOFOL 10 MG/ML
INJECTION, EMULSION INTRAVENOUS
Status: DISCONTINUED | OUTPATIENT
Start: 2024-10-11 | End: 2024-10-11 | Stop reason: SDUPTHER

## 2024-10-11 RX ADMIN — PROPOFOL 50 MG: 10 INJECTION, EMULSION INTRAVENOUS at 07:51

## 2024-10-11 RX ADMIN — LIDOCAINE HYDROCHLORIDE 100 MG: 20 INJECTION, SOLUTION INFILTRATION; PERINEURAL at 07:47

## 2024-10-11 RX ADMIN — SODIUM CHLORIDE 1000 ML: 9 INJECTION, SOLUTION INTRAVENOUS at 06:30

## 2024-10-11 RX ADMIN — PROPOFOL 50 MG: 10 INJECTION, EMULSION INTRAVENOUS at 07:49

## 2024-10-11 RX ADMIN — PROPOFOL 50 MG: 10 INJECTION, EMULSION INTRAVENOUS at 07:47

## 2024-10-11 RX ADMIN — SODIUM CHLORIDE: 9 INJECTION, SOLUTION INTRAVENOUS at 07:43

## 2024-10-11 ASSESSMENT — PAIN - FUNCTIONAL ASSESSMENT: PAIN_FUNCTIONAL_ASSESSMENT: NONE - DENIES PAIN

## 2024-10-11 NOTE — ANESTHESIA PRE PROCEDURE
for: \"PROTIME\", \"INR\", \"APTT\"    HCG (If Applicable):   Lab Results   Component Value Date    PREGTESTUR Negative 10/11/2024        ABGs: No results found for: \"PHART\", \"PO2ART\", \"YBE9RJG\", \"SOB1MGG\", \"BEART\", \"E7UFJFYX\"     Type & Screen (If Applicable):  No results found for: \"ABORH\", \"LABANTI\"    Drug/Infectious Status (If Applicable):  No results found for: \"HIV\", \"HEPCAB\"    COVID-19 Screening (If Applicable): No results found for: \"COVID19\"        Anesthesia Evaluation  Patient summary reviewed and Nursing notes reviewed  Airway: Mallampati: II  TM distance: >3 FB   Neck ROM: full  Mouth opening: > = 3 FB   Dental:      Comment: I upper tooth only     Pulmonary:normal exam  breath sounds clear to auscultation  (+)  COPD:    sleep apnea:                                  Cardiovascular:  Exercise tolerance: good (>4 METS)          Rhythm: regular  Rate: normal                    Neuro/Psych:   Negative Neuro/Psych ROS              GI/Hepatic/Renal:   (+) GERD:, morbid obesity          Endo/Other: Negative Endo/Other ROS                    Abdominal: normal exam            Vascular: negative vascular ROS.         Other Findings:       Anesthesia Plan      MAC     ASA 3       Induction: intravenous.      Anesthetic plan and risks discussed with patient.      Plan discussed with CRNA.    Attending anesthesiologist reviewed and agrees with Preprocedure content            Valentino Rondon MD   10/11/2024

## 2024-10-11 NOTE — PROGRESS NOTES
Pt awake and alert.  Pt on RA, VSS.  Friend in the waiting room.  Pt denies pain and nausea, tolerating PO.  Pt meets criteria to be discharged from phase 1.

## 2024-10-11 NOTE — H&P
alcohol.  Family History:       Problem Relation Age of Onset    Arthritis Mother     Stroke Mother     Diabetes Father     Elevated Lipids Father     Hypertension Father          REVIEW OF SYSTEMS:    Review of Systems - History obtained from the patient  General ROS: negative  Psychological ROS: negative  Ophthalmic ROS: negative  Neurological ROS: negative  ENT ROS: negative  Allergy and Immunology ROS: negative  Hematological and Lymphatic ROS: negative  Endocrine ROS: negative  Breast ROS: negative  Respiratory ROS: negative  Cardiovascular ROS: negative  Gastrointestinal ROS:negative  Genito-Urinary ROS: negative  Musculoskeletal ROS: negative   Skin ROS: negative      PHYSICAL EXAM:      /71   Pulse 62   Temp 97.7 °F (36.5 °C) (Oral)   Resp 14   Ht 1.6 m (5' 3\")   Wt 130.6 kg (287 lb 14.4 oz)   SpO2 97%   BMI 51.00 kg/m²  I      Physical Exam   Vitals Reviewed   Constitutional: Patient is oriented to person, place, and time. Patient appears well-developed and well-nourished. Patient is active and cooperative.  Non-toxic appearance. No distress.   HENT:   Head: Normocephalic and atraumatic. Head is without abrasion and without laceration. Hair is normal.   Right Ear: External ear normal. No lacerations. No drainage, swelling .   Left Ear: External ear normal. No lacerations. No drainage, swelling.   Nose: Nose normal. No nose lacerations or nasal deformity.   Eyes: Conjunctivae, EOM and lids are normal. Right eye exhibits no discharge. No foreign body present in the right eye. Left eye exhibits no discharge. No foreign body present in the left eye. No scleral icterus.   Neck: Trachea normal and normal range of motion. No JVD present.   Pulmonary/Chest: Effort normal. No accessory muscle usage or stridor. No apnea. No respiratory distress.   Cardiovascular: Normal rate and no JVD.   Abdominal: Normal appearance. Patient exhibits no distension.   Musculoskeletal: Normal range of motion. Patient  viscus,  risks of anesthesia, strictures, neurological complications, paralysis, unable to diagnose a condition or miss a diagnosis, complications from biopsy,  Deep Venous Thrombosis , pulmonary embolism and / or death.       Electronically signed by Jameson Houston MD , FACS, [facility]  on 10/11/2024 at 7:41 AM

## 2024-10-11 NOTE — PROGRESS NOTES
Discharge instructions reviewed with patient/friend. All home medications have been reviewed, questions answered and patient verbalized understanding.  Discharge instructions signed.  Pt dc'd per wheelchair.  Patient discharged home with belongings. Friend taking stable pt home.

## 2024-10-11 NOTE — ANESTHESIA POSTPROCEDURE EVALUATION
Department of Anesthesiology  Postprocedure Note    Patient: Tiff Baez  MRN: [id number]  YOB: 1970  Date of evaluation: 10/11/2024    Procedure Summary       Date: 10/11/24 Room / Location: [place] [id number] / [facility]    Anesthesia Start: 0743 Anesthesia Stop: 0759    Procedure: ESOPHAGOGASTRODUODENOSCOPY BIOPSY (Abdomen) Diagnosis:       Chronic GERD      (Chronic GERD [K21.9])    Surgeons: Jameson Houston MD Responsible Provider: Valentino Rondon MD    Anesthesia Type: MAC ASA Status: 3            Anesthesia Type: No value filed.    Odilia Phase I: Odilia Score: 10    Odilia Phase II:      Anesthesia Post Evaluation    Patient location during evaluation: PACU  Patient participation: complete - patient cannot participate  Level of consciousness: awake and alert  Airway patency: patent  Nausea & Vomiting: no nausea and no vomiting  Cardiovascular status: hemodynamically stable  Respiratory status: acceptable  Hydration status: stable  Pain management: satisfactory to patient    No notable events documented.

## 2024-10-11 NOTE — PROGRESS NOTES
Pt arrived from endo to PACU bay 4.  Report received from endo staff.  Pt arouses easily to voice.  Pt on RA, NSR, VSS.

## 2024-10-15 DIAGNOSIS — E66.01 MORBID OBESITY WITH BMI OF 50.0-59.9, ADULT: ICD-10-CM

## 2024-10-15 DIAGNOSIS — K21.9 CHRONIC GERD: Primary | ICD-10-CM

## 2024-10-15 RX ORDER — AMOXICILLIN 500 MG/1
1000 CAPSULE ORAL 2 TIMES DAILY
Qty: 56 CAPSULE | Refills: 0 | OUTPATIENT
Start: 2024-10-15 | End: 2024-10-29

## 2024-10-15 RX ORDER — CLARITHROMYCIN 500 MG/1
500 TABLET ORAL 2 TIMES DAILY
Qty: 28 TABLET | Refills: 0 | OUTPATIENT
Start: 2024-10-15 | End: 2024-10-29

## 2024-10-15 RX ORDER — METRONIDAZOLE 500 MG/1
500 TABLET ORAL 3 TIMES DAILY
Qty: 42 TABLET | Refills: 0 | Status: SHIPPED | OUTPATIENT
Start: 2024-10-15 | End: 2024-10-29

## 2024-10-15 RX ORDER — DOXYCYCLINE HYCLATE 100 MG
100 TABLET ORAL 2 TIMES DAILY
Qty: 28 TABLET | Refills: 0 | Status: SHIPPED | OUTPATIENT
Start: 2024-10-15 | End: 2024-10-29

## 2024-10-28 ENCOUNTER — OFFICE VISIT (OUTPATIENT)
Dept: BARIATRICS/WEIGHT MGMT | Age: 54
End: 2024-10-28
Payer: COMMERCIAL

## 2024-10-28 VITALS
DIASTOLIC BLOOD PRESSURE: 68 MMHG | SYSTOLIC BLOOD PRESSURE: 96 MMHG | WEIGHT: 289 LBS | HEIGHT: 61 IN | BODY MASS INDEX: 54.56 KG/M2 | HEART RATE: 72 BPM

## 2024-10-28 DIAGNOSIS — R73.03 PREDIABETES: ICD-10-CM

## 2024-10-28 DIAGNOSIS — K21.9 CHRONIC GERD: Primary | ICD-10-CM

## 2024-10-28 DIAGNOSIS — G47.33 OBSTRUCTIVE SLEEP APNEA: ICD-10-CM

## 2024-10-28 DIAGNOSIS — E78.2 MIXED HYPERLIPIDEMIA: ICD-10-CM

## 2024-10-28 DIAGNOSIS — E66.01 MORBID OBESITY WITH BMI OF 50.0-59.9, ADULT: ICD-10-CM

## 2024-10-28 PROCEDURE — 3017F COLORECTAL CA SCREEN DOC REV: CPT | Performed by: NURSE PRACTITIONER

## 2024-10-28 PROCEDURE — G8427 DOCREV CUR MEDS BY ELIG CLIN: HCPCS | Performed by: NURSE PRACTITIONER

## 2024-10-28 PROCEDURE — G2211 COMPLEX E/M VISIT ADD ON: HCPCS | Performed by: NURSE PRACTITIONER

## 2024-10-28 PROCEDURE — 99214 OFFICE O/P EST MOD 30 MIN: CPT | Performed by: NURSE PRACTITIONER

## 2024-10-28 PROCEDURE — G8417 CALC BMI ABV UP PARAM F/U: HCPCS | Performed by: NURSE PRACTITIONER

## 2024-10-28 PROCEDURE — 1036F TOBACCO NON-USER: CPT | Performed by: NURSE PRACTITIONER

## 2024-10-28 PROCEDURE — G8484 FLU IMMUNIZE NO ADMIN: HCPCS | Performed by: NURSE PRACTITIONER

## 2024-10-28 NOTE — PROGRESS NOTES
Tiff Baez lost 2 lbs over past month. Pt reports she is still struggling with giving up vaping.     Is pt eating at least 4 times everyday? Yes, is eating 4-5 x day     Is pt eating a lean protein source with all meals and snacks? Is eating at least 4 x day, mostly lean   10 a.m. - 2 turkey sausages with 3 scrambled eggs   2p.m. - chicken salad OR salad with pepperoni/turkey/chicken/CC  4 p.m. - CC and grapes   6 p.m. - baked chicken with veggies (mixed/brussels sprouts with light cheese sauce)   Sometimes wakes up hungry - CC OR 1/2 PB sandwich on WG bread     Has pt decreased their portions using the plate method? Reports her portions are inconsistent    Is pt choosing low fat/sugar free options? Mostly but had some higher calorie foods at a [employer] outing     Is pt drinking at least 64 oz of clear liquids everyday? Yes     Has pt stopped drinking carbonation, caffeinated, and sugar sweetened beverages? Yes -  drinks water, noncaffeinated tea with splenda, occasional decaf coffee with splenda; eliminated light OJ (80 calorie)     Has pt sampled Unjury and/or Nectar protein? Not yet, discussed today     Has pt attended a support group? Signed up for 10/29 group via Zoom     Participating in intentional exercise? Reports she has been less active. Doing some leg lifts in the kitchen but pulled a muscle so not doing these as much. Pt with hx of osteoarthritis and breathing issues. Pt reports she is now less reliant on her cane and uses more for stability around her house when standing up.     Plan/Recommendations:   Avoid high fat meats - jaylin   Discussed importance of avoiding higher fat/sugar foods to prepare for sx and life after sx   Attend SG   Try protein powder     Handouts: none     Rebeca Murray, RD, LD

## 2024-11-19 ENCOUNTER — OFFICE VISIT (OUTPATIENT)
Dept: BARIATRICS/WEIGHT MGMT | Age: 54
End: 2024-11-19
Payer: COMMERCIAL

## 2024-11-19 VITALS
OXYGEN SATURATION: 95 % | HEART RATE: 80 BPM | WEIGHT: 283 LBS | HEIGHT: 61 IN | BODY MASS INDEX: 53.43 KG/M2 | DIASTOLIC BLOOD PRESSURE: 78 MMHG | SYSTOLIC BLOOD PRESSURE: 120 MMHG | RESPIRATION RATE: 16 BRPM

## 2024-11-19 DIAGNOSIS — E78.2 MIXED HYPERLIPIDEMIA: ICD-10-CM

## 2024-11-19 DIAGNOSIS — K21.9 CHRONIC GERD: ICD-10-CM

## 2024-11-19 DIAGNOSIS — E66.01 MORBID OBESITY WITH BMI OF 50.0-59.9, ADULT: Primary | ICD-10-CM

## 2024-11-19 DIAGNOSIS — R73.03 PREDIABETES: ICD-10-CM

## 2024-11-19 DIAGNOSIS — G47.33 OBSTRUCTIVE SLEEP APNEA: ICD-10-CM

## 2024-11-19 PROCEDURE — G8427 DOCREV CUR MEDS BY ELIG CLIN: HCPCS | Performed by: SURGERY

## 2024-11-19 PROCEDURE — G8417 CALC BMI ABV UP PARAM F/U: HCPCS | Performed by: SURGERY

## 2024-11-19 PROCEDURE — 3017F COLORECTAL CA SCREEN DOC REV: CPT | Performed by: SURGERY

## 2024-11-19 PROCEDURE — 99214 OFFICE O/P EST MOD 30 MIN: CPT | Performed by: SURGERY

## 2024-11-19 PROCEDURE — 1036F TOBACCO NON-USER: CPT | Performed by: SURGERY

## 2024-11-19 PROCEDURE — G2211 COMPLEX E/M VISIT ADD ON: HCPCS | Performed by: SURGERY

## 2024-11-19 PROCEDURE — G8484 FLU IMMUNIZE NO ADMIN: HCPCS | Performed by: SURGERY

## 2024-11-19 NOTE — PROGRESS NOTES
Tiff Baez lost 6 lbs over 1 month.     Breakfast: 10 AM: 2 turkey sausages with 3 scrambled eggs   Snack: None  Lunch: 2 PM: Chix salad   Snack: 4 PM: CC + grapes  Dinner: 6 PM: Chix salad   Snack: None OR banana  **Eating sf candy throughout the day ~5 pieces    Is pt consuming smaller portions? Feels better control of EE (reading or walking instead of eating)     Is pt consuming at least 64 oz of fluids per day?  6 bottles water    Is pt consuming carbonated, caffeinated, or sugary beverages?  Avoiding     Has pt sampled Unjury and/or Nectar protein? Yes, likes beef herb and chocolate truffle w/ water    Has patient attended a support group? Completed October    Exercise: Moving/cleaning, generally more active + exercise for knee    Plan/Recommendations:   - Continue current eating patterns including protein and plants with all meals and snacks  -Try additional protein powder     Handouts: None    Ryanne Steiner RD, LD    
patient encounter.      Tiff is here for her second presurgical visit overall doing well.  Hopefully patient will be ready by next visit.  Patient will obtain her CPAP data for us.      We discussed how her excess weight affects her overall health and importance of weight loss, healthy diet and active lifestyle to alleviate those co morbid conditions, otherwise risk deterioration.       Morbid Obesity: Body mass index is 53.47 kg/m².  [x] Continue to make dietary and lifestyle modifications.  [x] Plan for Future laparoscopic sleeve gastrectomy.  [x] Return for follow-up next month.      Chronic GERD:   [x] Continue to make dietary and lifestyle modifications.  [x] Continue Omeprazole.  [] Continue Famotidine.  [] Plan for EGD to evaluate the stomach.        Prediabetes / Diabetes Mellitus Type II in Obese:   [x] Continue to make dietary and lifestyle modifications.  [x] Reviewed the importance of checking blood sugars.  [x] Continue to follow up with their PCP for medication management and monitoring.    Hyperlipidemia:   [x] Continue to make dietary and lifestyle modifications.  [x] Continue to follow up with their PCP for medication management and monitoring.    Obstructive Sleep Apnea:   [x] Continue to make dietary and lifestyle modifications.  [x] Reviewed the importance of wearing / compliance with CPAP/BIPAP.  Alternative would be positional therapy.  [x] Continue to follow up with their sleep medicine provider for CPAP/BIPAP management and monitoring, versus positional therapy.             Patient advised that its their responsibility to follow up for studies, referrals and/or labs ordered today.

## 2024-11-19 NOTE — PATIENT INSTRUCTIONS
Patient received dietary handouts and education.      Plan/Recommendations:   - Continue current eating patterns including protein and plants with all meals and snacks  -Try additional protein powder

## 2024-12-12 ENCOUNTER — TELEPHONE (OUTPATIENT)
Dept: BARIATRICS/WEIGHT MGMT | Age: 54
End: 2024-12-12

## 2024-12-12 NOTE — TELEPHONE ENCOUNTER
Pt called into clinic states she was told to get her blood work completed.  She is concerned as she has been prescribed Tylenol #3 for teeth extraction done earlier this week and wanted to notify our office of why she was prescribed this med.  Pt unable to give dentist name, but had dental work done at [facility] in [facility].  Called [facility], left voicemail for clinical staff to call this nurse to verify dentist name and prescription.  Awaiting call back.

## 2024-12-24 ENCOUNTER — OFFICE VISIT (OUTPATIENT)
Dept: BARIATRICS/WEIGHT MGMT | Age: 54
End: 2024-12-24
Payer: COMMERCIAL

## 2024-12-24 VITALS
BODY MASS INDEX: 53.81 KG/M2 | HEIGHT: 61 IN | SYSTOLIC BLOOD PRESSURE: 117 MMHG | RESPIRATION RATE: 16 BRPM | HEART RATE: 78 BPM | OXYGEN SATURATION: 96 % | WEIGHT: 285 LBS | DIASTOLIC BLOOD PRESSURE: 77 MMHG

## 2024-12-24 DIAGNOSIS — R73.03 PREDIABETES: ICD-10-CM

## 2024-12-24 DIAGNOSIS — G47.33 OBSTRUCTIVE SLEEP APNEA: ICD-10-CM

## 2024-12-24 DIAGNOSIS — E66.01 MORBID OBESITY WITH BMI OF 50.0-59.9, ADULT: Primary | ICD-10-CM

## 2024-12-24 DIAGNOSIS — K21.9 CHRONIC GERD: ICD-10-CM

## 2024-12-24 DIAGNOSIS — E78.2 MIXED HYPERLIPIDEMIA: ICD-10-CM

## 2024-12-24 PROCEDURE — 99214 OFFICE O/P EST MOD 30 MIN: CPT | Performed by: SURGERY

## 2024-12-24 PROCEDURE — G8484 FLU IMMUNIZE NO ADMIN: HCPCS | Performed by: SURGERY

## 2024-12-24 PROCEDURE — G8427 DOCREV CUR MEDS BY ELIG CLIN: HCPCS | Performed by: SURGERY

## 2024-12-24 PROCEDURE — 3017F COLORECTAL CA SCREEN DOC REV: CPT | Performed by: SURGERY

## 2024-12-24 PROCEDURE — G8417 CALC BMI ABV UP PARAM F/U: HCPCS | Performed by: SURGERY

## 2024-12-24 PROCEDURE — 1036F TOBACCO NON-USER: CPT | Performed by: SURGERY

## 2024-12-24 PROCEDURE — G2211 COMPLEX E/M VISIT ADD ON: HCPCS | Performed by: SURGERY

## 2024-12-24 NOTE — PROGRESS NOTES
Tiff Baez gained 2.0 lbs over month. Pt has all teeth extracted and has been only eating soft foods.      Breakfast: 4 scrambled eggs   Snack: cottage cheese OR SF popsicles OR SF jello  Lunch: mashed potatoes OR cottage cheese  Snack: none OR canned mixed fruit  Dinner: canned chicken   Snack: none    Is pt consuming smaller portions? yes      Is pt consuming at least 64 oz of fluids per day? yes 5 bottled water, decaf coffee, decaf tea    Is pt consuming carbonated, caffeinated, or sugary beverages? no    Has pt sampled Unjury and/or Nectar protein? Liked beef and herb and chocolate truffle, planned to by more today    Has patient attended a support group? Completed in October    Exercise: walking more     Plan/Recommendations:   - aim for 4-5 eating occurences per day  - watch portion sizes  - try a few more protein powders    Handouts: none     Zandra Dolan RD, LD  
history. The estimates are calculated using data from a large number of patients who had a primary bariatric surgical procedure similar to the one the patient may have.  Please note the risk percentages, remission percentages, BMI, weight change, and percent total weight change provided to you by the risk/benefit calculator are only estimates. These estimates only take certain information into account. There may be other factors that are not included in the estimate which may increase or decrease the risk of a complication, the chance of remission of a weight-related comorbidity, or the amount of weight the patient loses. These estimates are not a guarantee of results. A complication after surgery may happen even if the risk is low, a weight-related comorbidity may not go into remission even if the chances are high, and a patient may lose more or less weight than predicted. This information is not intended to replace the advice of a doctor or healthcare provider about the diagnosis, treatment, or potential outcomes. The Provider is not responsible for medical decisions that may be made by the patient based on the risk/benefit calculator estimates, since these estimates are provided for informational purposes. Patients should always consult their doctor or other health care provider before deciding on a treatment plan.        Both open and laparoscopic approach were explained in details. Benefits and risks explained. Informed consent obtained.   Risks including but not limited to;Infection, bleeding, gastric leak or obstruction, persistent nausea, vomiting, or reflux, injury to surrounding structures, risks of anesthesia, stricture, delayed gastric emptying, staple line leak, incisional hernia, malnutrition, vitamin deficiency, neurological complications, paralysis,  hair loss, and/ or Conversion to Open surgery may be necessary.  Failure to lose or maintain weight loss, Gallstones or Kidney Stones, Deep Venous

## 2024-12-30 NOTE — PROGRESS NOTES
[facility]   [facility]    Subjective:      Patient ID: Tiff Baez is a 54 y.o. female    HPI    The patient is here for their visit prior to the bariatric surgery preoperative class. She has made several attempts at weight loss in the past without success and now has been scheduled to have bariatric surgery on 2025 for future weight loss. She is working to change her dietary behaviors and lose weight to improve comorbid conditions such as prediabetes, hyperlipidemia, obstructive sleep apnea and GERD.    Tiff Baez is a very pleasant 54 y.o. female with Body mass index is 54.23 kg/m².    Past Medical History:   Diagnosis Date    Arthritis     Back pain     COPD (chronic obstructive pulmonary disease) (HCC)     Hyperlipidemia      Past Surgical History:   Procedure Laterality Date    ABLATION COLPOCLESIS      CARPAL TUNNEL RELEASE Bilateral     TOTAL KNEE ARTHROPLASTY Right 2024    UPPER GASTROINTESTINAL ENDOSCOPY N/A 10/11/2024    ESOPHAGOGASTRODUODENOSCOPY BIOPSY performed by Jameson Houston MD at [facility]     Family History   Problem Relation Age of Onset    Arthritis Mother     Stroke Mother     Diabetes Father     Elevated Lipids Father     Hypertension Father      Social History     Tobacco Use    Smoking status: Former     Current packs/day: 0.00     Average packs/day: 0.5 packs/day for 37.3 years (18.6 ttl pk-yrs)     Types: Cigarettes     Start date:      Quit date: 2024     Years since quittin.7    Smokeless tobacco: Never   Substance Use Topics    Alcohol use: Not Currently     I counseled the patient on the importance of not smoking and risks of ETOH.   No Known Allergies  Vitals:    25 1233   Weight: 130.2 kg (287 lb)   Height: 1.549 m (5' 1\")     Body mass index is 54.23 kg/m².    Current Outpatient Medications:     chlorhexidine (PERIDEX) 0.12 % solution, Swish and spit 15 mLs 2 times daily, Disp: , Rfl:

## 2025-01-06 NOTE — PROGRESS NOTES
Patient Name:   Tiff Baez      Surgeon:   Rosemarie       Type of Surgery:  Sleeve         Date of Surgery:  Monday February 17th     Start Pre-Op Diet On:  Tuesday February 4th      Start Clear Liquids On:  Sunday February 16th

## 2025-01-14 ENCOUNTER — TELEPHONE (OUTPATIENT)
Dept: BARIATRICS/WEIGHT MGMT | Age: 55
End: 2025-01-14

## 2025-01-14 ENCOUNTER — OFFICE VISIT (OUTPATIENT)
Dept: BARIATRICS/WEIGHT MGMT | Age: 55
End: 2025-01-14
Payer: COMMERCIAL

## 2025-01-14 VITALS — WEIGHT: 287 LBS | HEIGHT: 61 IN | BODY MASS INDEX: 54.19 KG/M2

## 2025-01-14 DIAGNOSIS — G47.33 OBSTRUCTIVE SLEEP APNEA: ICD-10-CM

## 2025-01-14 DIAGNOSIS — K21.9 CHRONIC GERD: Primary | ICD-10-CM

## 2025-01-14 DIAGNOSIS — E78.2 MIXED HYPERLIPIDEMIA: ICD-10-CM

## 2025-01-14 DIAGNOSIS — E66.01 MORBID OBESITY WITH BMI OF 50.0-59.9, ADULT: ICD-10-CM

## 2025-01-14 DIAGNOSIS — R73.03 PREDIABETES: ICD-10-CM

## 2025-01-14 PROCEDURE — G8417 CALC BMI ABV UP PARAM F/U: HCPCS | Performed by: NURSE PRACTITIONER

## 2025-01-14 PROCEDURE — 3017F COLORECTAL CA SCREEN DOC REV: CPT | Performed by: NURSE PRACTITIONER

## 2025-01-14 PROCEDURE — G8427 DOCREV CUR MEDS BY ELIG CLIN: HCPCS | Performed by: NURSE PRACTITIONER

## 2025-01-14 PROCEDURE — 99214 OFFICE O/P EST MOD 30 MIN: CPT | Performed by: NURSE PRACTITIONER

## 2025-01-14 PROCEDURE — 1036F TOBACCO NON-USER: CPT | Performed by: NURSE PRACTITIONER

## 2025-01-14 RX ORDER — IPRATROPIUM BROMIDE AND ALBUTEROL SULFATE 2.5; .5 MG/3ML; MG/3ML
SOLUTION RESPIRATORY (INHALATION)
COMMUNITY

## 2025-01-14 RX ORDER — GABAPENTIN 300 MG/1
300 CAPSULE ORAL 2 TIMES DAILY
COMMUNITY
Start: 2025-01-02

## 2025-01-14 RX ORDER — CHLORHEXIDINE GLUCONATE ORAL RINSE 1.2 MG/ML
15 SOLUTION DENTAL 2 TIMES DAILY
COMMUNITY
Start: 2024-12-09

## 2025-01-16 ENCOUNTER — PREP FOR PROCEDURE (OUTPATIENT)
Dept: BARIATRICS/WEIGHT MGMT | Age: 55
End: 2025-01-16

## 2025-01-16 DIAGNOSIS — E66.01 MORBID OBESITY: Primary | ICD-10-CM

## 2025-01-16 DIAGNOSIS — E66.01 MORBID OBESITY WITH BMI OF 50.0-59.9, ADULT: ICD-10-CM

## 2025-01-16 RX ORDER — SODIUM CHLORIDE, SODIUM LACTATE, POTASSIUM CHLORIDE, CALCIUM CHLORIDE 600; 310; 30; 20 MG/100ML; MG/100ML; MG/100ML; MG/100ML
INJECTION, SOLUTION INTRAVENOUS CONTINUOUS
Status: CANCELLED | OUTPATIENT
Start: 2025-01-16

## 2025-01-16 RX ORDER — SODIUM CHLORIDE 0.9 % (FLUSH) 0.9 %
5-40 SYRINGE (ML) INJECTION PRN
Status: CANCELLED | OUTPATIENT
Start: 2025-01-16

## 2025-01-16 RX ORDER — SODIUM CHLORIDE 9 MG/ML
INJECTION, SOLUTION INTRAVENOUS PRN
Status: CANCELLED | OUTPATIENT
Start: 2025-01-16

## 2025-01-16 RX ORDER — SCOPOLAMINE 1 MG/3D
1 PATCH, EXTENDED RELEASE TRANSDERMAL ONCE
Status: CANCELLED | OUTPATIENT
Start: 2025-01-16 | End: 2025-01-16

## 2025-01-16 RX ORDER — SODIUM CHLORIDE 0.9 % (FLUSH) 0.9 %
5-40 SYRINGE (ML) INJECTION EVERY 12 HOURS SCHEDULED
Status: CANCELLED | OUTPATIENT
Start: 2025-01-16

## 2025-01-16 RX ORDER — ENOXAPARIN SODIUM 100 MG/ML
30 INJECTION SUBCUTANEOUS ONCE
Status: CANCELLED | OUTPATIENT
Start: 2025-01-16 | End: 2025-01-16

## 2025-01-16 RX ORDER — ACETAMINOPHEN 160 MG/5ML
650 LIQUID ORAL ONCE
Status: CANCELLED | OUTPATIENT
Start: 2025-01-16 | End: 2025-01-16

## 2025-02-14 ENCOUNTER — TELEPHONE (OUTPATIENT)
Dept: BARIATRICS/WEIGHT MGMT | Age: 55
End: 2025-02-14

## 2025-02-14 NOTE — TELEPHONE ENCOUNTER
TC to pt, discussed pre op diet on Sunday is clear liquid only (no protein shakes and no solid food) and encouraged pt to drink sugar free electrolyte drink Sunday evening to promote hydration.  Reminded pt of NPO status after midnight and no medications by mouth Monday morning before surgery. Discussed items to bring to the hospital to make her stay more comfortable.  Reviewed arrival time and location.  Pt verbalized understanding and has no further questions or concerns.

## 2025-02-17 ENCOUNTER — HOSPITAL ENCOUNTER (INPATIENT)
Age: 55
LOS: 1 days | Discharge: HOME | DRG: 403 | End: 2025-02-18
Attending: SURGERY | Admitting: SURGERY
Payer: COMMERCIAL

## 2025-02-17 ENCOUNTER — ANESTHESIA (OUTPATIENT)
Dept: OPERATING ROOM | Age: 55
DRG: 403 | End: 2025-02-17
Payer: COMMERCIAL

## 2025-02-17 ENCOUNTER — CLINICAL DOCUMENTATION (OUTPATIENT)
Dept: BARIATRICS/WEIGHT MGMT | Age: 55
End: 2025-02-17

## 2025-02-17 ENCOUNTER — ANESTHESIA EVENT (OUTPATIENT)
Dept: OPERATING ROOM | Age: 55
DRG: 403 | End: 2025-02-17
Payer: COMMERCIAL

## 2025-02-17 DIAGNOSIS — Z98.84 S/P LAPAROSCOPIC SLEEVE GASTRECTOMY: Primary | ICD-10-CM

## 2025-02-17 DIAGNOSIS — E66.01 MORBID OBESITY WITH BMI OF 50.0-59.9, ADULT: ICD-10-CM

## 2025-02-17 LAB
ABO + RH BLD: NORMAL
ALBUMIN SERPL-MCNC: 4.4 G/DL (ref 3.4–5)
ALBUMIN/GLOB SERPL: 1.5 {RATIO} (ref 1.1–2.2)
ALP SERPL-CCNC: 98 U/L (ref 40–129)
ALT SERPL-CCNC: 17 U/L (ref 10–40)
ANION GAP SERPL CALCULATED.3IONS-SCNC: 11 MMOL/L (ref 3–16)
AST SERPL-CCNC: 22 U/L (ref 15–37)
BILIRUB SERPL-MCNC: 0.4 MG/DL (ref 0–1)
BLD GP AB SCN SERPL QL: NORMAL
BUN SERPL-MCNC: 11 MG/DL (ref 7–20)
CALCIUM SERPL-MCNC: 9.1 MG/DL (ref 8.3–10.6)
CHLORIDE SERPL-SCNC: 105 MMOL/L (ref 99–110)
CO2 SERPL-SCNC: 24 MMOL/L (ref 21–32)
CREAT SERPL-MCNC: 0.7 MG/DL (ref 0.6–1.1)
DEPRECATED RDW RBC AUTO: 14.2 % (ref 12.4–15.4)
GFR SERPLBLD CREATININE-BSD FMLA CKD-EPI: >90 ML/MIN/{1.73_M2}
GLUCOSE SERPL-MCNC: 104 MG/DL (ref 70–99)
HCG UR QL: NEGATIVE
HCT VFR BLD AUTO: 38.2 % (ref 36–48)
HGB BLD-MCNC: 12.6 G/DL (ref 12–16)
MCH RBC QN AUTO: 28.7 PG (ref 26–34)
MCHC RBC AUTO-ENTMCNC: 33 G/DL (ref 31–36)
MCV RBC AUTO: 87.1 FL (ref 80–100)
PLATELET # BLD AUTO: 299 K/UL (ref 135–450)
PMV BLD AUTO: 8.1 FL (ref 5–10.5)
POTASSIUM SERPL-SCNC: 4.1 MMOL/L (ref 3.5–5.1)
PROT SERPL-MCNC: 7.4 G/DL (ref 6.4–8.2)
RBC # BLD AUTO: 4.39 M/UL (ref 4–5.2)
SODIUM SERPL-SCNC: 140 MMOL/L (ref 136–145)
WBC # BLD AUTO: 10.8 K/UL (ref 4–11)

## 2025-02-17 PROCEDURE — 96372 THER/PROPH/DIAG INJ SC/IM: CPT

## 2025-02-17 PROCEDURE — 86900 BLOOD TYPING SEROLOGIC ABO: CPT

## 2025-02-17 PROCEDURE — 43775 LAP SLEEVE GASTRECTOMY: CPT | Performed by: SURGERY

## 2025-02-17 PROCEDURE — 3600000005 HC SURGERY LEVEL 5 BASE: Performed by: SURGERY

## 2025-02-17 PROCEDURE — 0DB64Z3 EXCISION OF STOMACH, PERCUTANEOUS ENDOSCOPIC APPROACH, VERTICAL: ICD-10-PCS | Performed by: SURGERY

## 2025-02-17 PROCEDURE — 6360000002 HC RX W HCPCS: Performed by: NURSE ANESTHETIST, CERTIFIED REGISTERED

## 2025-02-17 PROCEDURE — 36415 COLL VENOUS BLD VENIPUNCTURE: CPT

## 2025-02-17 PROCEDURE — 6370000000 HC RX 637 (ALT 250 FOR IP): Performed by: SURGERY

## 2025-02-17 PROCEDURE — 3700000001 HC ADD 15 MINUTES (ANESTHESIA): Performed by: SURGERY

## 2025-02-17 PROCEDURE — 6360000002 HC RX W HCPCS: Performed by: SURGERY

## 2025-02-17 PROCEDURE — 7100000001 HC PACU RECOVERY - ADDTL 15 MIN: Performed by: SURGERY

## 2025-02-17 PROCEDURE — 80053 COMPREHEN METABOLIC PANEL: CPT

## 2025-02-17 PROCEDURE — 2500000003 HC RX 250 WO HCPCS: Performed by: SURGERY

## 2025-02-17 PROCEDURE — 2700000000 HC OXYGEN THERAPY PER DAY

## 2025-02-17 PROCEDURE — 86901 BLOOD TYPING SEROLOGIC RH(D): CPT

## 2025-02-17 PROCEDURE — P9045 ALBUMIN (HUMAN), 5%, 250 ML: HCPCS | Performed by: NURSE ANESTHETIST, CERTIFIED REGISTERED

## 2025-02-17 PROCEDURE — 86850 RBC ANTIBODY SCREEN: CPT

## 2025-02-17 PROCEDURE — 94150 VITAL CAPACITY TEST: CPT

## 2025-02-17 PROCEDURE — 88307 TISSUE EXAM BY PATHOLOGIST: CPT

## 2025-02-17 PROCEDURE — 7100000000 HC PACU RECOVERY - FIRST 15 MIN: Performed by: SURGERY

## 2025-02-17 PROCEDURE — 2709999900 HC NON-CHARGEABLE SUPPLY: Performed by: SURGERY

## 2025-02-17 PROCEDURE — G0378 HOSPITAL OBSERVATION PER HR: HCPCS

## 2025-02-17 PROCEDURE — 6360000002 HC RX W HCPCS: Performed by: ANESTHESIOLOGY

## 2025-02-17 PROCEDURE — 85027 COMPLETE CBC AUTOMATED: CPT

## 2025-02-17 PROCEDURE — 2580000003 HC RX 258: Performed by: NURSE ANESTHETIST, CERTIFIED REGISTERED

## 2025-02-17 PROCEDURE — 2500000003 HC RX 250 WO HCPCS: Performed by: NURSE ANESTHETIST, CERTIFIED REGISTERED

## 2025-02-17 PROCEDURE — 3700000000 HC ANESTHESIA ATTENDED CARE: Performed by: SURGERY

## 2025-02-17 PROCEDURE — 5A09357 ASSISTANCE WITH RESPIRATORY VENTILATION, LESS THAN 24 CONSECUTIVE HOURS, CONTINUOUS POSITIVE AIRWAY PRESSURE: ICD-10-PCS | Performed by: SURGERY

## 2025-02-17 PROCEDURE — 2580000003 HC RX 258: Performed by: SURGERY

## 2025-02-17 PROCEDURE — 1200000000 HC SEMI PRIVATE

## 2025-02-17 PROCEDURE — 88342 IMHCHEM/IMCYTCHM 1ST ANTB: CPT

## 2025-02-17 PROCEDURE — 94761 N-INVAS EAR/PLS OXIMETRY MLT: CPT

## 2025-02-17 PROCEDURE — 3600000015 HC SURGERY LEVEL 5 ADDTL 15MIN: Performed by: SURGERY

## 2025-02-17 PROCEDURE — 2720000010 HC SURG SUPPLY STERILE: Performed by: SURGERY

## 2025-02-17 PROCEDURE — 84703 CHORIONIC GONADOTROPIN ASSAY: CPT

## 2025-02-17 PROCEDURE — 94640 AIRWAY INHALATION TREATMENT: CPT

## 2025-02-17 PROCEDURE — 94660 CPAP INITIATION&MGMT: CPT

## 2025-02-17 PROCEDURE — 6360000002 HC RX W HCPCS

## 2025-02-17 RX ORDER — EPHEDRINE SULFATE 50 MG/ML
INJECTION INTRAVENOUS
Status: DISCONTINUED | OUTPATIENT
Start: 2025-02-17 | End: 2025-02-17 | Stop reason: SDUPTHER

## 2025-02-17 RX ORDER — SODIUM CHLORIDE 9 MG/ML
INJECTION, SOLUTION INTRAVENOUS PRN
Status: DISCONTINUED | OUTPATIENT
Start: 2025-02-17 | End: 2025-02-18 | Stop reason: HOSPADM

## 2025-02-17 RX ORDER — ONDANSETRON 4 MG/1
4 TABLET, ORALLY DISINTEGRATING ORAL EVERY 8 HOURS PRN
Status: DISCONTINUED | OUTPATIENT
Start: 2025-02-17 | End: 2025-02-18 | Stop reason: HOSPADM

## 2025-02-17 RX ORDER — DEXAMETHASONE SODIUM PHOSPHATE 4 MG/ML
INJECTION, SOLUTION INTRA-ARTICULAR; INTRALESIONAL; INTRAMUSCULAR; INTRAVENOUS; SOFT TISSUE
Status: DISCONTINUED | OUTPATIENT
Start: 2025-02-17 | End: 2025-02-17 | Stop reason: SDUPTHER

## 2025-02-17 RX ORDER — ONDANSETRON 2 MG/ML
4 INJECTION INTRAMUSCULAR; INTRAVENOUS EVERY 6 HOURS PRN
Status: DISCONTINUED | OUTPATIENT
Start: 2025-02-17 | End: 2025-02-18 | Stop reason: HOSPADM

## 2025-02-17 RX ORDER — BUDESONIDE AND FORMOTEROL FUMARATE DIHYDRATE 160; 4.5 UG/1; UG/1
2 AEROSOL RESPIRATORY (INHALATION) 2 TIMES DAILY
Status: DISCONTINUED | OUTPATIENT
Start: 2025-02-17 | End: 2025-02-18 | Stop reason: HOSPADM

## 2025-02-17 RX ORDER — SODIUM CHLORIDE 0.9 % (FLUSH) 0.9 %
5-40 SYRINGE (ML) INJECTION PRN
Status: DISCONTINUED | OUTPATIENT
Start: 2025-02-17 | End: 2025-02-17 | Stop reason: HOSPADM

## 2025-02-17 RX ORDER — LIDOCAINE 4 G/G
1 PATCH TOPICAL DAILY
Status: DISCONTINUED | OUTPATIENT
Start: 2025-02-17 | End: 2025-02-18 | Stop reason: HOSPADM

## 2025-02-17 RX ORDER — NALOXONE HYDROCHLORIDE 0.4 MG/ML
INJECTION, SOLUTION INTRAMUSCULAR; INTRAVENOUS; SUBCUTANEOUS PRN
Status: DISCONTINUED | OUTPATIENT
Start: 2025-02-17 | End: 2025-02-18

## 2025-02-17 RX ORDER — SODIUM CHLORIDE 9 MG/ML
INJECTION, SOLUTION INTRAVENOUS PRN
Status: DISCONTINUED | OUTPATIENT
Start: 2025-02-17 | End: 2025-02-17 | Stop reason: HOSPADM

## 2025-02-17 RX ORDER — PHENYLEPHRINE HCL IN 0.9% NACL 1 MG/10 ML
SYRINGE (ML) INTRAVENOUS
Status: DISCONTINUED | OUTPATIENT
Start: 2025-02-17 | End: 2025-02-17 | Stop reason: SDUPTHER

## 2025-02-17 RX ORDER — METOCLOPRAMIDE HYDROCHLORIDE 5 MG/ML
10 INJECTION INTRAMUSCULAR; INTRAVENOUS EVERY 6 HOURS PRN
Status: DISCONTINUED | OUTPATIENT
Start: 2025-02-17 | End: 2025-02-18 | Stop reason: HOSPADM

## 2025-02-17 RX ORDER — SUCCINYLCHOLINE/SOD CL,ISO/PF 200MG/10ML
SYRINGE (ML) INTRAVENOUS
Status: DISCONTINUED | OUTPATIENT
Start: 2025-02-17 | End: 2025-02-17 | Stop reason: SDUPTHER

## 2025-02-17 RX ORDER — SODIUM CHLORIDE 0.9 % (FLUSH) 0.9 %
5-40 SYRINGE (ML) INJECTION PRN
Status: DISCONTINUED | OUTPATIENT
Start: 2025-02-17 | End: 2025-02-18 | Stop reason: HOSPADM

## 2025-02-17 RX ORDER — SODIUM CHLORIDE 0.9 % (FLUSH) 0.9 %
5-40 SYRINGE (ML) INJECTION EVERY 12 HOURS SCHEDULED
Status: DISCONTINUED | OUTPATIENT
Start: 2025-02-17 | End: 2025-02-17 | Stop reason: HOSPADM

## 2025-02-17 RX ORDER — SODIUM CHLORIDE, SODIUM LACTATE, POTASSIUM CHLORIDE, CALCIUM CHLORIDE 600; 310; 30; 20 MG/100ML; MG/100ML; MG/100ML; MG/100ML
INJECTION, SOLUTION INTRAVENOUS CONTINUOUS
Status: DISCONTINUED | OUTPATIENT
Start: 2025-02-17 | End: 2025-02-17 | Stop reason: CLARIF

## 2025-02-17 RX ORDER — ONDANSETRON 2 MG/ML
4 INJECTION INTRAMUSCULAR; INTRAVENOUS
Status: COMPLETED | OUTPATIENT
Start: 2025-02-17 | End: 2025-02-17

## 2025-02-17 RX ORDER — PROMETHAZINE HYDROCHLORIDE 25 MG/ML
6.25 INJECTION, SOLUTION INTRAMUSCULAR; INTRAVENOUS EVERY 6 HOURS PRN
Status: DISCONTINUED | OUTPATIENT
Start: 2025-02-17 | End: 2025-02-18 | Stop reason: HOSPADM

## 2025-02-17 RX ORDER — HYDROMORPHONE HYDROCHLORIDE 2 MG/ML
0.5 INJECTION, SOLUTION INTRAMUSCULAR; INTRAVENOUS; SUBCUTANEOUS EVERY 5 MIN PRN
Status: DISCONTINUED | OUTPATIENT
Start: 2025-02-17 | End: 2025-02-17 | Stop reason: HOSPADM

## 2025-02-17 RX ORDER — GLYCOPYRROLATE 0.2 MG/ML
INJECTION INTRAMUSCULAR; INTRAVENOUS
Status: DISCONTINUED | OUTPATIENT
Start: 2025-02-17 | End: 2025-02-17 | Stop reason: SDUPTHER

## 2025-02-17 RX ORDER — HYDRALAZINE HYDROCHLORIDE 20 MG/ML
10 INJECTION INTRAMUSCULAR; INTRAVENOUS
Status: DISCONTINUED | OUTPATIENT
Start: 2025-02-17 | End: 2025-02-18 | Stop reason: HOSPADM

## 2025-02-17 RX ORDER — ROCURONIUM BROMIDE 10 MG/ML
INJECTION, SOLUTION INTRAVENOUS
Status: DISCONTINUED | OUTPATIENT
Start: 2025-02-17 | End: 2025-02-17 | Stop reason: SDUPTHER

## 2025-02-17 RX ORDER — MIDAZOLAM HYDROCHLORIDE 1 MG/ML
INJECTION, SOLUTION INTRAMUSCULAR; INTRAVENOUS
Status: DISCONTINUED | OUTPATIENT
Start: 2025-02-17 | End: 2025-02-17 | Stop reason: SDUPTHER

## 2025-02-17 RX ORDER — ACETAMINOPHEN 160 MG/5ML
650 LIQUID ORAL ONCE
Status: COMPLETED | OUTPATIENT
Start: 2025-02-17 | End: 2025-02-17

## 2025-02-17 RX ORDER — IPRATROPIUM BROMIDE AND ALBUTEROL SULFATE 2.5; .5 MG/3ML; MG/3ML
1 SOLUTION RESPIRATORY (INHALATION)
Status: DISCONTINUED | OUTPATIENT
Start: 2025-02-17 | End: 2025-02-18 | Stop reason: HOSPADM

## 2025-02-17 RX ORDER — NALOXONE HYDROCHLORIDE 0.4 MG/ML
INJECTION, SOLUTION INTRAMUSCULAR; INTRAVENOUS; SUBCUTANEOUS PRN
Status: DISCONTINUED | OUTPATIENT
Start: 2025-02-17 | End: 2025-02-17 | Stop reason: HOSPADM

## 2025-02-17 RX ORDER — SCOPOLAMINE 1 MG/3D
1 PATCH, EXTENDED RELEASE TRANSDERMAL
Status: DISCONTINUED | OUTPATIENT
Start: 2025-02-17 | End: 2025-02-18 | Stop reason: HOSPADM

## 2025-02-17 RX ORDER — LIDOCAINE HYDROCHLORIDE 20 MG/ML
INJECTION, SOLUTION EPIDURAL; INFILTRATION; INTRACAUDAL; PERINEURAL
Status: DISCONTINUED | OUTPATIENT
Start: 2025-02-17 | End: 2025-02-17 | Stop reason: SDUPTHER

## 2025-02-17 RX ORDER — HYDROMORPHONE HYDROCHLORIDE 1 MG/ML
0.5 INJECTION, SOLUTION INTRAMUSCULAR; INTRAVENOUS; SUBCUTANEOUS
Status: DISCONTINUED | OUTPATIENT
Start: 2025-02-17 | End: 2025-02-18 | Stop reason: HOSPADM

## 2025-02-17 RX ORDER — ONDANSETRON 2 MG/ML
INJECTION INTRAMUSCULAR; INTRAVENOUS
Status: DISCONTINUED | OUTPATIENT
Start: 2025-02-17 | End: 2025-02-17 | Stop reason: SDUPTHER

## 2025-02-17 RX ORDER — FENTANYL CITRATE 50 UG/ML
INJECTION, SOLUTION INTRAMUSCULAR; INTRAVENOUS
Status: DISCONTINUED | OUTPATIENT
Start: 2025-02-17 | End: 2025-02-17 | Stop reason: SDUPTHER

## 2025-02-17 RX ORDER — ENOXAPARIN SODIUM 100 MG/ML
30 INJECTION SUBCUTANEOUS ONCE
Status: COMPLETED | OUTPATIENT
Start: 2025-02-17 | End: 2025-02-17

## 2025-02-17 RX ORDER — ALBUMIN HUMAN 50 G/1000ML
SOLUTION INTRAVENOUS
Status: DISCONTINUED | OUTPATIENT
Start: 2025-02-17 | End: 2025-02-17 | Stop reason: SDUPTHER

## 2025-02-17 RX ORDER — ALBUTEROL SULFATE 90 UG/1
2 INHALANT RESPIRATORY (INHALATION) EVERY 6 HOURS PRN
Status: DISCONTINUED | OUTPATIENT
Start: 2025-02-17 | End: 2025-02-18 | Stop reason: HOSPADM

## 2025-02-17 RX ORDER — SODIUM CHLORIDE 9 MG/ML
INJECTION, SOLUTION INTRAVENOUS CONTINUOUS
Status: DISCONTINUED | OUTPATIENT
Start: 2025-02-17 | End: 2025-02-17 | Stop reason: HOSPADM

## 2025-02-17 RX ORDER — DIPHENHYDRAMINE HYDROCHLORIDE 50 MG/ML
12.5 INJECTION INTRAMUSCULAR; INTRAVENOUS EVERY 6 HOURS PRN
Status: DISCONTINUED | OUTPATIENT
Start: 2025-02-17 | End: 2025-02-18 | Stop reason: HOSPADM

## 2025-02-17 RX ORDER — MAGNESIUM SULFATE HEPTAHYDRATE 500 MG/ML
INJECTION, SOLUTION INTRAMUSCULAR; INTRAVENOUS
Status: DISCONTINUED | OUTPATIENT
Start: 2025-02-17 | End: 2025-02-17 | Stop reason: SDUPTHER

## 2025-02-17 RX ORDER — MICONAZOLE NITRATE 20 MG/G
CREAM TOPICAL 2 TIMES DAILY
Status: DISCONTINUED | OUTPATIENT
Start: 2025-02-17 | End: 2025-02-17

## 2025-02-17 RX ORDER — SODIUM CHLORIDE 9 MG/ML
INJECTION, SOLUTION INTRAVENOUS CONTINUOUS
Status: ACTIVE | OUTPATIENT
Start: 2025-02-17 | End: 2025-02-17

## 2025-02-17 RX ORDER — MEPERIDINE HYDROCHLORIDE 25 MG/ML
12.5 INJECTION INTRAMUSCULAR; INTRAVENOUS; SUBCUTANEOUS EVERY 5 MIN PRN
Status: DISCONTINUED | OUTPATIENT
Start: 2025-02-17 | End: 2025-02-17 | Stop reason: HOSPADM

## 2025-02-17 RX ORDER — ENOXAPARIN SODIUM 100 MG/ML
30 INJECTION SUBCUTANEOUS EVERY 12 HOURS SCHEDULED
Status: DISCONTINUED | OUTPATIENT
Start: 2025-02-17 | End: 2025-02-18 | Stop reason: HOSPADM

## 2025-02-17 RX ORDER — PROPOFOL 10 MG/ML
INJECTION, EMULSION INTRAVENOUS
Status: DISCONTINUED | OUTPATIENT
Start: 2025-02-17 | End: 2025-02-17 | Stop reason: SDUPTHER

## 2025-02-17 RX ORDER — MAGNESIUM HYDROXIDE 1200 MG/15ML
LIQUID ORAL CONTINUOUS PRN
Status: COMPLETED | OUTPATIENT
Start: 2025-02-17 | End: 2025-02-17

## 2025-02-17 RX ORDER — SODIUM CHLORIDE 0.9 % (FLUSH) 0.9 %
5-40 SYRINGE (ML) INJECTION EVERY 12 HOURS SCHEDULED
Status: DISCONTINUED | OUTPATIENT
Start: 2025-02-17 | End: 2025-02-18 | Stop reason: HOSPADM

## 2025-02-17 RX ORDER — BUPIVACAINE HYDROCHLORIDE 5 MG/ML
INJECTION, SOLUTION EPIDURAL; INTRACAUDAL
Status: COMPLETED | OUTPATIENT
Start: 2025-02-17 | End: 2025-02-17

## 2025-02-17 RX ORDER — HYOSCYAMINE SULFATE 0.5 MG/ML
0.25 INJECTION, SOLUTION SUBCUTANEOUS EVERY 4 HOURS PRN
Status: DISCONTINUED | OUTPATIENT
Start: 2025-02-17 | End: 2025-02-18 | Stop reason: HOSPADM

## 2025-02-17 RX ORDER — LABETALOL HYDROCHLORIDE 5 MG/ML
10 INJECTION, SOLUTION INTRAVENOUS
Status: DISCONTINUED | OUTPATIENT
Start: 2025-02-17 | End: 2025-02-18 | Stop reason: HOSPADM

## 2025-02-17 RX ORDER — SCOPOLAMINE 1 MG/3D
1 PATCH, EXTENDED RELEASE TRANSDERMAL ONCE
Status: DISCONTINUED | OUTPATIENT
Start: 2025-02-17 | End: 2025-02-17

## 2025-02-17 RX ADMIN — ROCURONIUM BROMIDE 50 MG: 10 INJECTION INTRAVENOUS at 07:40

## 2025-02-17 RX ADMIN — SODIUM CHLORIDE, PRESERVATIVE FREE 10 ML: 5 INJECTION INTRAVENOUS at 20:34

## 2025-02-17 RX ADMIN — ENOXAPARIN SODIUM 30 MG: 100 INJECTION SUBCUTANEOUS at 20:34

## 2025-02-17 RX ADMIN — ACETAMINOPHEN 650 MG: 650 SOLUTION ORAL at 06:42

## 2025-02-17 RX ADMIN — ONDANSETRON 4 MG: 2 INJECTION, SOLUTION INTRAMUSCULAR; INTRAVENOUS at 09:31

## 2025-02-17 RX ADMIN — EPHEDRINE SULFATE 5 MG: 50 INJECTION, SOLUTION INTRAVENOUS at 07:56

## 2025-02-17 RX ADMIN — MIDAZOLAM 2 MG: 1 INJECTION INTRAMUSCULAR; INTRAVENOUS at 07:27

## 2025-02-17 RX ADMIN — Medication 100 MCG: at 07:42

## 2025-02-17 RX ADMIN — DEXAMETHASONE SODIUM PHOSPHATE 4 MG: 4 INJECTION, SOLUTION INTRAMUSCULAR; INTRAVENOUS at 07:50

## 2025-02-17 RX ADMIN — Medication 2 PUFF: at 21:37

## 2025-02-17 RX ADMIN — FENTANYL CITRATE 25 MCG: 50 INJECTION, SOLUTION INTRAMUSCULAR; INTRAVENOUS at 08:35

## 2025-02-17 RX ADMIN — SODIUM CHLORIDE 3000 MG: 900 INJECTION INTRAVENOUS at 07:42

## 2025-02-17 RX ADMIN — PROPOFOL 200 MG: 10 INJECTION, EMULSION INTRAVENOUS at 07:32

## 2025-02-17 RX ADMIN — IPRATROPIUM BROMIDE AND ALBUTEROL SULFATE 1 DOSE: .5; 3 SOLUTION RESPIRATORY (INHALATION) at 13:01

## 2025-02-17 RX ADMIN — Medication 100 MCG: at 07:36

## 2025-02-17 RX ADMIN — Medication: at 09:49

## 2025-02-17 RX ADMIN — GLYCOPYRROLATE 0.2 MG: 0.2 INJECTION INTRAMUSCULAR; INTRAVENOUS at 07:42

## 2025-02-17 RX ADMIN — SODIUM CHLORIDE 75 MG: 9 INJECTION, SOLUTION INTRAVENOUS at 06:44

## 2025-02-17 RX ADMIN — Medication 10 MG: at 08:18

## 2025-02-17 RX ADMIN — SODIUM CHLORIDE: 9 INJECTION, SOLUTION INTRAVENOUS at 09:57

## 2025-02-17 RX ADMIN — HYDROMORPHONE HYDROCHLORIDE 0.5 MG: 2 INJECTION, SOLUTION INTRAMUSCULAR; INTRAVENOUS; SUBCUTANEOUS at 09:33

## 2025-02-17 RX ADMIN — EPHEDRINE SULFATE 5 MG: 50 INJECTION, SOLUTION INTRAVENOUS at 08:10

## 2025-02-17 RX ADMIN — FENTANYL CITRATE 50 MCG: 50 INJECTION, SOLUTION INTRAMUSCULAR; INTRAVENOUS at 08:56

## 2025-02-17 RX ADMIN — IPRATROPIUM BROMIDE AND ALBUTEROL SULFATE 1 DOSE: .5; 3 SOLUTION RESPIRATORY (INHALATION) at 21:36

## 2025-02-17 RX ADMIN — SUGAMMADEX 200 MG: 100 INJECTION, SOLUTION INTRAVENOUS at 08:47

## 2025-02-17 RX ADMIN — LIDOCAINE HYDROCHLORIDE 100 MG: 20 INJECTION, SOLUTION EPIDURAL; INFILTRATION; INTRACAUDAL; PERINEURAL at 07:32

## 2025-02-17 RX ADMIN — ALBUMIN (HUMAN) 12.5 G: 12.5 INJECTION, SOLUTION INTRAVENOUS at 07:44

## 2025-02-17 RX ADMIN — Medication 10 MG: at 08:25

## 2025-02-17 RX ADMIN — ENOXAPARIN SODIUM 30 MG: 100 INJECTION SUBCUTANEOUS at 06:43

## 2025-02-17 RX ADMIN — Medication 2 PUFF: at 13:01

## 2025-02-17 RX ADMIN — IPRATROPIUM BROMIDE AND ALBUTEROL SULFATE 1 DOSE: .5; 3 SOLUTION RESPIRATORY (INHALATION) at 15:51

## 2025-02-17 RX ADMIN — PHENYLEPHRINE HYDROCHLORIDE 100 MCG/MIN: 10 INJECTION INTRAVENOUS at 07:48

## 2025-02-17 RX ADMIN — FENTANYL CITRATE 25 MCG: 50 INJECTION, SOLUTION INTRAMUSCULAR; INTRAVENOUS at 07:58

## 2025-02-17 RX ADMIN — ONDANSETRON 4 MG: 2 INJECTION, SOLUTION INTRAMUSCULAR; INTRAVENOUS at 07:50

## 2025-02-17 RX ADMIN — Medication 120 MG: at 07:33

## 2025-02-17 RX ADMIN — MAGNESIUM SULFATE HEPTAHYDRATE 1 G: 500 INJECTION, SOLUTION INTRAMUSCULAR; INTRAVENOUS at 07:45

## 2025-02-17 RX ADMIN — SODIUM CHLORIDE: 9 INJECTION, SOLUTION INTRAVENOUS at 06:45

## 2025-02-17 ASSESSMENT — PAIN DESCRIPTION - PAIN TYPE
TYPE: SURGICAL PAIN
TYPE: SURGICAL PAIN

## 2025-02-17 ASSESSMENT — PAIN DESCRIPTION - DESCRIPTORS
DESCRIPTORS: ACHING;DISCOMFORT
DESCRIPTORS: ACHING;DISCOMFORT

## 2025-02-17 ASSESSMENT — PAIN - FUNCTIONAL ASSESSMENT: PAIN_FUNCTIONAL_ASSESSMENT: NONE - DENIES PAIN

## 2025-02-17 ASSESSMENT — PAIN DESCRIPTION - LOCATION
LOCATION: ABDOMEN

## 2025-02-17 ASSESSMENT — PAIN DESCRIPTION - FREQUENCY
FREQUENCY: CONTINUOUS
FREQUENCY: CONTINUOUS

## 2025-02-17 ASSESSMENT — PAIN DESCRIPTION - ORIENTATION
ORIENTATION: MID
ORIENTATION: MID

## 2025-02-17 ASSESSMENT — PAIN SCALES - GENERAL
PAINLEVEL_OUTOF10: 0
PAINLEVEL_OUTOF10: 7

## 2025-02-17 ASSESSMENT — ENCOUNTER SYMPTOMS: SHORTNESS OF BREATH: 0

## 2025-02-17 NOTE — PROGRESS NOTES
Nursing care provided to prep patient for surgery.  Patient lost 5 lbs. on pre-op diet, informed surgeon.  Pre-op orders completed.  Bilateral foot pneumatic sleeves applied.  Teaching / education initiated regarding perioperative experience, post-op expectations and plan of care, and pain management during hospital stay.  Patient verbalized understanding. The care plan and education has been reviewed and mutually agreed upon with the patient.

## 2025-02-17 NOTE — DISCHARGE INSTRUCTIONS
You may restart the medications listed in your discharge paperwork. There are some that you will hold for 2 weeks and they are listed later in this paragraph. Unless otherwise noted you will crush your medications for the first 2 weeks post op and mix them with clears for the first four days and then with your pureed food for the remainder of the 2 weeks. If you do not tolerate your medications crushed you may quarter or cut in half and take one piece at a time. You may take your omeprazole (Prilosec) and gabapentin (Neurontin) whole as they cannot be opened. After two weeks, you may start taking all your pills whole. Please make sure when taking whole pills, you do not take them all at once. Take them one at a time and allow a minute or so between each one.    Activity  You are encouraged to walk through the entire postoperative period as this will help with preventing clots, pneumonia and constipation. You are restricted from lifting anything greater than 10 lbs for the first 6 weeks after surgery. You may shower starting on postoperative day #2 (second day after surgery), but should not get into baths, pools and/or hot tubs until the provider releases you to do so.    Driving  You should not drive for at least the first week after your surgery and/or if you are still taking pain medication. Most patients generally drive to their 2-week postoperative appointment.    Constipation  Constipation can occur following surgery. This is due to anesthesia, decreased fluids and fiber in your diet, drinking protein shakes and taking pain medication. Make sure you are getting 48-64 ounces of fluids per day and walking. If you develop constipation you may use docusate sodium (Colace) or Dulcolax which are over-the-counter stool softeners and can be taken 1-2 times per day. If the stool softeners do not help in 2-3 days you can use Miralax which is an over-the-counter laxative and can be taken daily. If this does not help  please call the office for further instructions.    Pain Management  Following surgery, you will have some pain related to your incisions and from the gas instilled into your abdomen during surgery. It is important for you to walk frequently after surgery to help dissipate the gas pain and to keep your incisions from getting stiff. To help with incisional pain we will provide a prescription for pain medication, but also recommend that you ice your incisions for the days and weeks after surgery. When icing your incisions follow the recommendation of 15 minutes on/15 minutes off rotating to different areas. This will help with the swelling and inflammation related to your incisional pain. In addition, keep wearing your abdominal binder at a minimum when you are up and about to help with support. You may also get over-the-counter Icy Hot or Salon Pas lidocaine patches to help with pain.     Vitamins (Fusion)  When starting the Fusion multivitamin take one per day. Calcium chews do not start until after 6 week post-op visit.      Phase I Diet  Please reference the Preoperative Class diet instructions and the Bariatric Surgery Dietary Discharge Instructions handout reviewed in the hospital. You will be on a clear liquid diet for postop day one and two. Your goal is to get in 48-64oz of fluids daily. (You may increase from the 3-4 oz per hour on POD #3. The increase should be by one ounce per hour each day. Goal by the end of the weekend should be 6-8 oz per hour for Monday surgeries and 4-6 oz per hour for Wednesday surgeries.).Refer to the Clear Liquids List for a list of acceptable clear liquids.    Phase II Diet  Please reference the Preoperative Class diet instructions, the Bariatric Surgery Dietary Discharge Instructions handout and the Ideal Meal Process handout reviewed in the hospital. You will start your full liquid diet on postop day three. Your priority is to still get in 48-64oz of fluids daily, but you can

## 2025-02-17 NOTE — ANESTHESIA PRE PROCEDURE
m (5' 3\")                                              BP Readings from Last 3 Encounters:   12/24/24 117/77   11/19/24 120/78   10/28/24 96/68       NPO Status: Time of last liquid consumption: 2300                        Time of last solid consumption: 2200                        Date of last liquid consumption: 02/16/25                        Date of last solid food consumption: 02/15/25    BMI:   Wt Readings from Last 3 Encounters:   01/20/25 130.6 kg (288 lb)   01/14/25 130.2 kg (287 lb)   12/24/24 129.3 kg (285 lb)     Body mass index is 51.02 kg/m².    CBC: No results found for: \"WBC\", \"RBC\", \"HGB\", \"HCT\", \"MCV\", \"RDW\", \"PLT\"    CMP: No results found for: \"NA\", \"K\", \"CL\", \"CO2\", \"BUN\", \"CREATININE\", \"GFRAA\", \"AGRATIO\", \"LABGLOM\", \"GLUCOSE\", \"GLU\", \"CALCIUM\", \"BILITOT\", \"ALKPHOS\", \"AST\", \"ALT\"    POC Tests: No results for input(s): \"POCGLU\", \"POCNA\", \"POCK\", \"POCCL\", \"POCBUN\", \"POCHEMO\", \"POCHCT\" in the last 72 hours.    Coags: No results found for: \"PROTIME\", \"INR\", \"APTT\"    HCG (If Applicable):   Lab Results   Component Value Date    PREGTESTUR Negative 10/11/2024        ABGs: No results found for: \"PHART\", \"PO2ART\", \"MBI4CJY\", \"GZQ6GEK\", \"BEART\", \"B6MSZITG\"     Type & Screen (If Applicable):  No results found for: \"ABORH\", \"LABANTI\"    Drug/Infectious Status (If Applicable):  No results found for: \"HIV\", \"HEPCAB\"    COVID-19 Screening (If Applicable): No results found for: \"COVID19\"        Anesthesia Evaluation  Patient summary reviewed and Nursing notes reviewed   no history of anesthetic complications:   Airway: Mallampati: III  TM distance: >3 FB   Neck ROM: full  Mouth opening: > = 3 FB   Dental: normal exam         Pulmonary:normal exam    (+)  COPD:    sleep apnea:           (-) asthma and shortness of breath                           Cardiovascular:        (-) hypertension, CABG/stent and dysrhythmias      Rhythm: regular  Rate: normal  Echocardiogram reviewed  Stress test reviewed

## 2025-02-17 NOTE — OP NOTE
Operative Note      Patient: Tiff Baez  YOB: 1970  MRN: [id number]    Date of Procedure: 2/17/2025  [facility]   [facility]  [address] [place] [address], [address] [place] [address] .  Phone: [phone]  Fax: [phone]             Procedure Note    Indications: The patient was evaluated by our multidisciplinary team and was found to be a good candidate for weight loss surgery for future weight loss. Body mass index is 49.95 kg/m²..   Risks and benefits explained and Tiff Baez wants to proceed.         Pre-operative Diagnosis:   Patient Active Problem List    Diagnosis Date Noted    Obstructive sleep apnea 04/19/2024    Mixed hyperlipidemia 04/19/2024    Chronic GERD 04/19/2024    Morbid obesity with BMI of 50.0-59.9, adult 04/19/2024    Prediabetes 04/19/2024         Post-operative Diagnosis:   Same      Procedure:    - Laparoscopic Sleeve Gastrectomy.        Surgeon: Jameson Houston MD, FACS, [facility].    Anesthesia: General endotracheal anesthesia        Procedure Details   The patient was seen again in the Holding Room. The risks, benefits, complications, treatment options, and expected outcomes were discussed with the patient and/or family.  Both open and laparoscopic approach were explained in details. Benefits and risks explained. Informed consent obtained.   Risks including but not limited to;Infection, bleeding, gastric leak or obstruction, persistent nausea, vomiting, or reflux, injury to surrounding structures, risks of anesthesia, stricture, delayed gastric emptying, staple line leak, incisional hernia, malnutrition, vitamin deficiency, neurological complications, paralysis,  hair loss, and/ or Conversion to Open surgery may be necessary.  Failure to lose or maintain weight loss, Gallstones or Kidney Stones, Deep Venous Thrombosis , pulmonary embolism and / or death.    I did explain thoroughly to the patient on multiple occasions that compliance  with pre- and post op diet and other recommendations are integral part to improve the chances of successful weight loss and also not following it could end with serious health complications.     There was concurrence with the proposed plan and informed consent was obtained.  The site of surgery was properly noted/marked. The patient was taken to Operating Room, identified as Tiff Baez and the procedure verified as Laparoscopic Sleeve Gastrectomy & other indicated procedures. A Time Out was held and the above information confirmed.    The patient was placed in the supine position and general anesthesia was induced, along with placement of orogastric tube, Venodyne boots. Lovenox SQ, Emend and IV Antibiotics given pre-operatively. All pressure points were padded properly. Patient prepped and draped in sterile fashion.     A left upper quadrant incision was made and a veres needle was inserted after confirming with saline drop test.  After adequate pneumoperitoneum was obtained, a 5 mm trocar was inserted.  This was followed by a endoscope which confirmed intra-abdominal placement and there was no injury on initial trocar placement.  Additional ports were placed in the standard positions under direct vision.  The abdomen was initially explored and no abnormalities were identified.  A liver retractor was inserted through a small incision in the upper midline, lifted the liver upward, and was then secured to the OR table.      The pylorus was identified and measurement was taken approximately 4-6 cm from the pylorus along the greater curvature of the stomach.  The harmonic scalpel was used to take down the attachments and short gastric vessels along the greater curve of the stomach.  This was continued until all attachments were taken down and continued to the gastro-esophageal junction.  A 34 [place] citizen dilator was placed along the lesser curvature and into the pylorus.      A stapler was fired along the dilator to

## 2025-02-17 NOTE — H&P
[facility]   [facility]  Jameson Houston MD, FACS, [address], [address]    [facility] [address] .  Phone: [phone]  Fax: [phone]                  Patient Active Problem List    Diagnosis Date Noted    Obstructive sleep apnea 04/19/2024    Mixed hyperlipidemia 04/19/2024    Chronic GERD 04/19/2024    Morbid obesity with BMI of 50.0-59.9, adult 04/19/2024    Prediabetes 04/19/2024             HISTORY OF PRESENT ILLNESS:    Tiff Baez is a very pleasant 54 y.o. with Body mass index is 49.95 kg/m². who is presenting for planned Laparoscopic Sleeve Gastrectomy for future weight loss.       Past Medical History:        Diagnosis Date    Arthritis     Back pain     COPD (chronic obstructive pulmonary disease) (HCC)     Hyperlipidemia     MATEUS on CPAP      Past Surgical History:        Procedure Laterality Date    ABLATION COLPOCLESIS      CARPAL TUNNEL RELEASE Bilateral 2021    TOTAL KNEE ARTHROPLASTY Right 04/03/2024    UPPER GASTROINTESTINAL ENDOSCOPY N/A 10/11/2024    ESOPHAGOGASTRODUODENOSCOPY BIOPSY performed by Jameson Houston MD at [facility] ENDOSCOPY     Medications Prior to Admission:   Medications Prior to Admission: gabapentin (NEURONTIN) 300 MG capsule, Take 1 capsule by mouth 2 times daily.  ipratropium 0.5 mg-albuterol 2.5 mg (DUONEB) 0.5-2.5 (3) MG/3ML SOLN nebulizer solution, INHALE 3 ML AS DIRECTED EVERY 4 HOURS AS NEEDED  omeprazole (PRILOSEC) 20 MG delayed release capsule, Take 1 capsule by mouth every morning (before breakfast)  escitalopram (LEXAPRO) 10 MG tablet,   budesonide-formoterol (SYMBICORT) 160-4.5 MCG/ACT AERO, Inhale 2 puffs into the lungs 2 times daily  atorvastatin (LIPITOR) 10 MG tablet, Take 1 tablet by mouth nightly  albuterol sulfate HFA (PROVENTIL;VENTOLIN;PROAIR) 108 (90 Base) MCG/ACT inhaler, Inhale 2 puffs into the lungs every 6 hours as needed for Wheezing  chlorhexidine (PERIDEX) 0.12 % solution, Swish and spit 15 mLs

## 2025-02-17 NOTE — PROGRESS NOTES
Pt admit to pacu from OR, awakening.  VSS.  5 abdominal incisions with glue approximated.  Will monitor

## 2025-02-17 NOTE — CARE COORDINATION
Discharge Planning Note:    Chart reviewed and it appears that patient has minimal needs for discharge at this time. Risk Score 4 %     Primary Care Physician is RERE CARO   Primary insurance is [facility]    Patient is from home and here for bariatric surgery.      Please notify case management if any discharge needs are identified.      Case management will continue to follow progress and update discharge plan as needed.    Electronically signed by GENO Cardozo on 2/17/2025 at 2:15 PM

## 2025-02-17 NOTE — ANESTHESIA POSTPROCEDURE EVALUATION
Department of Anesthesiology  Postprocedure Note    Patient: Tiff Baez  MRN: [id number]  YOB: 1970  Date of evaluation: 2/17/2025    Procedure Summary       Date: 02/17/25 Room / Location: [address]    Anesthesia Start: 0727 Anesthesia Stop: 0905    Procedure: LAPAROSCOPIC SLEEVE GASTRECTOMY (Abdomen) Diagnosis:       Morbid obesity with BMI of 50.0-59.9, adult      (Morbid obesity with BMI of 50.0-59.9, adult [E66.01, Z68.43])    Surgeons: Jameson Houston MD Responsible Provider: Cuco Nicolas MD    Anesthesia Type: General ASA Status: 3            Anesthesia Type: General    Odilia Phase I: Odilia Score: 10    Odilia Phase II:      Anesthesia Post Evaluation    Patient location during evaluation: PACU  Patient participation: complete - patient participated  Level of consciousness: awake  Airway patency: patent  Nausea & Vomiting: no vomiting and no nausea  Cardiovascular status: hemodynamically stable  Respiratory status: acceptable  Hydration status: stable  Multimodal analgesia pain management approach  Pain management: adequate    No notable events documented.

## 2025-02-17 NOTE — PROGRESS NOTES
Phase 1 discharge criteria met.  VSS.  Abdominal incisions approximated.  PCA Dilaudid and IVF  infusing per order.  Pt appears comfortable, resting with eyes closed.  Pt will transfer to T in stable condition

## 2025-02-18 ENCOUNTER — APPOINTMENT (OUTPATIENT)
Dept: GENERAL RADIOLOGY | Age: 55
DRG: 403 | End: 2025-02-18
Attending: SURGERY
Payer: COMMERCIAL

## 2025-02-18 VITALS
TEMPERATURE: 97.8 F | HEIGHT: 63 IN | DIASTOLIC BLOOD PRESSURE: 61 MMHG | BODY MASS INDEX: 49.96 KG/M2 | HEART RATE: 80 BPM | SYSTOLIC BLOOD PRESSURE: 97 MMHG | WEIGHT: 282 LBS | RESPIRATION RATE: 16 BRPM | OXYGEN SATURATION: 94 %

## 2025-02-18 PROBLEM — E66.01 MORBID OBESITY WITH BMI OF 50.0-59.9, ADULT: Status: RESOLVED | Noted: 2024-04-19 | Resolved: 2025-02-18

## 2025-02-18 LAB
ANION GAP SERPL CALCULATED.3IONS-SCNC: 9 MMOL/L (ref 3–16)
BUN SERPL-MCNC: 7 MG/DL (ref 7–20)
CALCIUM SERPL-MCNC: 8.3 MG/DL (ref 8.3–10.6)
CHLORIDE SERPL-SCNC: 108 MMOL/L (ref 99–110)
CO2 SERPL-SCNC: 22 MMOL/L (ref 21–32)
CREAT SERPL-MCNC: 0.5 MG/DL (ref 0.6–1.1)
DEPRECATED RDW RBC AUTO: 14 % (ref 12.4–15.4)
GFR SERPLBLD CREATININE-BSD FMLA CKD-EPI: >90 ML/MIN/{1.73_M2}
GLUCOSE SERPL-MCNC: 155 MG/DL (ref 70–99)
HCT VFR BLD AUTO: 33.3 % (ref 36–48)
HGB BLD-MCNC: 11 G/DL (ref 12–16)
MCH RBC QN AUTO: 28.9 PG (ref 26–34)
MCHC RBC AUTO-ENTMCNC: 32.9 G/DL (ref 31–36)
MCV RBC AUTO: 87.7 FL (ref 80–100)
PLATELET # BLD AUTO: 274 K/UL (ref 135–450)
PMV BLD AUTO: 8.2 FL (ref 5–10.5)
POTASSIUM SERPL-SCNC: 4 MMOL/L (ref 3.5–5.1)
RBC # BLD AUTO: 3.79 M/UL (ref 4–5.2)
SODIUM SERPL-SCNC: 139 MMOL/L (ref 136–145)
WBC # BLD AUTO: 14.5 K/UL (ref 4–11)

## 2025-02-18 PROCEDURE — 2580000003 HC RX 258: Performed by: NURSE PRACTITIONER

## 2025-02-18 PROCEDURE — 36415 COLL VENOUS BLD VENIPUNCTURE: CPT

## 2025-02-18 PROCEDURE — 6370000000 HC RX 637 (ALT 250 FOR IP): Performed by: SURGERY

## 2025-02-18 PROCEDURE — 2500000003 HC RX 250 WO HCPCS: Performed by: SURGERY

## 2025-02-18 PROCEDURE — 96374 THER/PROPH/DIAG INJ IV PUSH: CPT

## 2025-02-18 PROCEDURE — 96375 TX/PRO/DX INJ NEW DRUG ADDON: CPT

## 2025-02-18 PROCEDURE — 85027 COMPLETE CBC AUTOMATED: CPT

## 2025-02-18 PROCEDURE — 74240 X-RAY XM UPR GI TRC 1CNTRST: CPT

## 2025-02-18 PROCEDURE — APPSS180 APP SPLIT SHARED TIME > 60 MINUTES: Performed by: NURSE PRACTITIONER

## 2025-02-18 PROCEDURE — G0378 HOSPITAL OBSERVATION PER HR: HCPCS

## 2025-02-18 PROCEDURE — 94761 N-INVAS EAR/PLS OXIMETRY MLT: CPT

## 2025-02-18 PROCEDURE — 6360000004 HC RX CONTRAST MEDICATION

## 2025-02-18 PROCEDURE — 6360000002 HC RX W HCPCS: Performed by: SURGERY

## 2025-02-18 PROCEDURE — 99024 POSTOP FOLLOW-UP VISIT: CPT | Performed by: NURSE PRACTITIONER

## 2025-02-18 PROCEDURE — 80048 BASIC METABOLIC PNL TOTAL CA: CPT

## 2025-02-18 PROCEDURE — 96372 THER/PROPH/DIAG INJ SC/IM: CPT

## 2025-02-18 PROCEDURE — 94640 AIRWAY INHALATION TREATMENT: CPT

## 2025-02-18 PROCEDURE — 6360000002 HC RX W HCPCS: Performed by: NURSE PRACTITIONER

## 2025-02-18 PROCEDURE — 2580000003 HC RX 258: Performed by: SURGERY

## 2025-02-18 RX ORDER — ONDANSETRON 8 MG/1
8 TABLET, ORALLY DISINTEGRATING ORAL EVERY 8 HOURS PRN
Qty: 30 TABLET | Refills: 0 | Status: SHIPPED | OUTPATIENT
Start: 2025-02-18

## 2025-02-18 RX ORDER — OXYCODONE AND ACETAMINOPHEN 5; 325 MG/1; MG/1
1 TABLET ORAL EVERY 4 HOURS PRN
Status: DISCONTINUED | OUTPATIENT
Start: 2025-02-18 | End: 2025-02-18 | Stop reason: HOSPADM

## 2025-02-18 RX ORDER — KETOROLAC TROMETHAMINE 30 MG/ML
15 INJECTION, SOLUTION INTRAMUSCULAR; INTRAVENOUS EVERY 6 HOURS
Status: COMPLETED | OUTPATIENT
Start: 2025-02-18 | End: 2025-02-18

## 2025-02-18 RX ORDER — OXYCODONE AND ACETAMINOPHEN 5; 325 MG/1; MG/1
2 TABLET ORAL EVERY 4 HOURS PRN
Status: DISCONTINUED | OUTPATIENT
Start: 2025-02-18 | End: 2025-02-18 | Stop reason: HOSPADM

## 2025-02-18 RX ORDER — OXYCODONE AND ACETAMINOPHEN 5; 325 MG/1; MG/1
1 TABLET ORAL EVERY 6 HOURS PRN
Qty: 28 TABLET | Refills: 0 | Status: SHIPPED | OUTPATIENT
Start: 2025-02-18 | End: 2025-02-25

## 2025-02-18 RX ORDER — PROMETHAZINE HYDROCHLORIDE 6.25 MG/5ML
12.5 SYRUP ORAL EVERY 6 HOURS PRN
Status: DISCONTINUED | OUTPATIENT
Start: 2025-02-18 | End: 2025-02-18 | Stop reason: HOSPADM

## 2025-02-18 RX ORDER — SODIUM CHLORIDE 9 MG/ML
INJECTION, SOLUTION INTRAVENOUS CONTINUOUS
Status: DISCONTINUED | OUTPATIENT
Start: 2025-02-18 | End: 2025-02-18 | Stop reason: HOSPADM

## 2025-02-18 RX ORDER — ONDANSETRON 8 MG/1
8 TABLET, ORALLY DISINTEGRATING ORAL EVERY 8 HOURS PRN
Qty: 30 TABLET | Refills: 1 | Status: SHIPPED | OUTPATIENT
Start: 2025-02-18

## 2025-02-18 RX ADMIN — KETOROLAC TROMETHAMINE 15 MG: 30 INJECTION, SOLUTION INTRAMUSCULAR at 11:19

## 2025-02-18 RX ADMIN — IOHEXOL 50 ML: 350 INJECTION, SOLUTION INTRAVENOUS at 08:40

## 2025-02-18 RX ADMIN — Medication 2 PUFF: at 11:22

## 2025-02-18 RX ADMIN — ENOXAPARIN SODIUM 30 MG: 100 INJECTION SUBCUTANEOUS at 07:48

## 2025-02-18 RX ADMIN — SODIUM CHLORIDE, PRESERVATIVE FREE 40 MG: 5 INJECTION INTRAVENOUS at 07:48

## 2025-02-18 RX ADMIN — HYDROMORPHONE HYDROCHLORIDE 0.5 MG: 1 INJECTION, SOLUTION INTRAMUSCULAR; INTRAVENOUS; SUBCUTANEOUS at 15:54

## 2025-02-18 RX ADMIN — SODIUM CHLORIDE, PRESERVATIVE FREE 10 ML: 5 INJECTION INTRAVENOUS at 07:56

## 2025-02-18 RX ADMIN — SODIUM CHLORIDE: 9 INJECTION, SOLUTION INTRAVENOUS at 11:18

## 2025-02-18 RX ADMIN — IPRATROPIUM BROMIDE AND ALBUTEROL SULFATE 1 DOSE: .5; 3 SOLUTION RESPIRATORY (INHALATION) at 16:04

## 2025-02-18 RX ADMIN — IPRATROPIUM BROMIDE AND ALBUTEROL SULFATE 1 DOSE: .5; 3 SOLUTION RESPIRATORY (INHALATION) at 11:22

## 2025-02-18 ASSESSMENT — PAIN SCALES - GENERAL
PAINLEVEL_OUTOF10: 0
PAINLEVEL_OUTOF10: 1
PAINLEVEL_OUTOF10: 5

## 2025-02-18 ASSESSMENT — PAIN DESCRIPTION - LOCATION
LOCATION: ABDOMEN
LOCATION: ABDOMEN

## 2025-02-18 ASSESSMENT — PAIN DESCRIPTION - ORIENTATION
ORIENTATION: RIGHT
ORIENTATION: RIGHT

## 2025-02-18 ASSESSMENT — PAIN DESCRIPTION - DESCRIPTORS: DESCRIPTORS: ACHING

## 2025-02-18 NOTE — CARE COORDINATION
Case Management -  Discharge Note      Patient Name: Tiff Baez                   YOB: 1970  Room: [address] [place], [place] [address]            Readmission Risk (Low < 19, Mod (19-27), High > 27): Readmission Risk Score: 5.4    Current PCP: Jaimie Watts      (IMM) Important Message from Medicare:    Has pt received appropriate compliance notices before being discharged if required: N/A  Compliance doc:  [] 2nd IMM; [] Code 44 [] Weeks  Date Given: n/a Given By: n/a    PT AM-PAC:   /24  OT AM-PAC:   /24    Financial    Payor: [facility] / Plan: [facility] MEDICAID / Product Type: *No Product type* /     Pharmacy:  Potential assistance Purchasing Medications:    Meds-to-Beds request: Yes      [id number] - [place], [place] - [address] [facility] [phone] -  [phone]  [address] [facility] [address]  Phone: [phone] Fax: [phone]      Notes:    Additional Case Management Notes: Patient discharging to home today. Family to transport.    Electronically signed by Shila Pandey RN on 2/18/2025 at 12:00 PM

## 2025-02-18 NOTE — PROGRESS NOTES
[facility]   General & Laparoscopic Surgery  [facility]       Pt seen and examined    S/p laparoscopic sleeve gastrectomy     The patient is ambulating in guillory. Pain is well controlled. There is no nausea and/or vomiting. There are no other complaints or questions.     Vitals:    02/18/25 0200 02/18/25 0430 02/18/25 0730 02/18/25 0745   BP: (!) 92/54 (!) 100/58 110/68 110/68   Pulse: 60 66 63 63   Resp:  18 18 18   Temp:  98 °F (36.7 °C) 97.4 °F (36.3 °C) 97.4 °F (36.3 °C)   TempSrc:  Oral Oral Oral   SpO2: 97% 96%  96%   Weight:       Height:         Abdomen is soft, appropriately tender, incisions stable with no erythema.   Breath sounds are clear bilaterally.  Bowel sounds are hypoactive in all quadrants.    Data  CBC:   Lab Results   Component Value Date/Time    WBC 14.5 02/18/2025 04:53 AM    RBC 3.79 02/18/2025 04:53 AM    HGB 11.0 02/18/2025 04:53 AM    HCT 33.3 02/18/2025 04:53 AM    MCV 87.7 02/18/2025 04:53 AM    MCH 28.9 02/18/2025 04:53 AM    MCHC 32.9 02/18/2025 04:53 AM    RDW 14.0 02/18/2025 04:53 AM     02/18/2025 04:53 AM    MPV 8.2 02/18/2025 04:53 AM     BMP:    Lab Results   Component Value Date/Time     02/18/2025 04:53 AM    K 4.0 02/18/2025 04:53 AM     02/18/2025 04:53 AM    CO2 22 02/18/2025 04:53 AM    BUN 7 02/18/2025 04:53 AM    CREATININE 0.5 02/18/2025 04:53 AM    CALCIUM 8.3 02/18/2025 04:53 AM    LABGLOM >90 02/18/2025 04:53 AM    GLUCOSE 155 02/18/2025 04:53 AM     Current Inpatient Medications    Current Facility-Administered Medications: albuterol sulfate HFA (PROVENTIL;VENTOLIN;PROAIR) 108 (90 Base) MCG/ACT inhaler 2 puff, 2 puff, Inhalation, Q6H PRN  budesonide-formoterol (SYMBICORT) 160-4.5 MCG/ACT inhaler 2 puff, 2 puff, Inhalation, BID  ipratropium 0.5 mg-albuterol 2.5 mg (DUONEB) nebulizer solution 1 Dose, 1 Dose, Inhalation, 4x Daily RT  pantoprazole (PROTONIX) 40 mg in sodium chloride (PF) 0.9 % 10 mL injection, 40 mg,

## 2025-02-18 NOTE — PROGRESS NOTES
Pt resting in bed, up as tolerated, walked around unit this AM with no issues, voiding OK, vitals are stable, call light within reach.

## 2025-02-18 NOTE — PLAN OF CARE
Problem: Discharge Planning  Goal: Discharge to home or other facility with appropriate resources  2/18/2025 0744 by Marshall Ramírez RN  Outcome: Progressing  2/17/2025 2253 by Lucia Hernandez RN  Outcome: Progressing     Problem: Pain  Goal: Verbalizes/displays adequate comfort level or baseline comfort level  2/18/2025 0744 by Marshall Ramírez RN  Outcome: Progressing  2/17/2025 2253 by Lucia Hernandez, GREY  Outcome: Progressing

## 2025-02-18 NOTE — PROGRESS NOTES
Patient s/p sleeve gastrectomy.  VSS. Abdomen soft, appropriately tender, incision stable, Patient ambulating in hallway, with binder in place,  foot pump bilateral foot on while on bed, I/O being monitored, patient remains NPO. Patient instructed on proper use and frequency of incentive spirometer.  Pain controlled with PCA and use of ice, discussed plan of care with patient, to have UGI in the morning and if normal will be able to start Phase 1 clear liquid diet, The care plan and education has been reviewed and mutually agreed upon with the patient, respiratory informed for use of BIPAP at night. Will continue to monitored.  Lucia Hernandez RN

## 2025-02-18 NOTE — DISCHARGE SUMMARY
The patient was admitted on day of surgery and underwent a laparoscopic sleeve gastrectomy. Surgery went well and the patient went to our post-op bariatric floor. Overnight, the patient had stable vitals signs, good urine output and ambulated in the halls. On POD #1 the patient underwent an UGI which revealed no leak or obstruction. Phase I diet was started and the patient tolerated well. The patient has no N/V, tolerating diet, ambulating and pain controlled on oral medication.    The patient was discharged home today in stable condition. The patient will f/u in 2 weeks and was given dietary and ambulation instruction. The patient was instructed to call if there are any questions or concerns.     Patient Active Problem List    Diagnosis Date Noted    S/P laparoscopic sleeve gastrectomy 02/17/2025    Morbid obesity with BMI of 45.0-49.9, adult 02/17/2025    Obstructive sleep apnea 04/19/2024    Mixed hyperlipidemia 04/19/2024    Chronic GERD 04/19/2024    Morbid obesity with BMI of 50.0-59.9, adult 04/19/2024    Prediabetes 04/19/2024

## 2025-02-18 NOTE — PROGRESS NOTES
CLINICAL PHARMACY NOTE: MEDS TO BEDS    Total # of Prescriptions Filled: 2   The following medications were delivered to the patient:  ONDANSETRON 8MG ODT  OXYCODONE - ACETAMINOPHEN 5 - 325 TABS    Additional Documentation: Marshall EDMONDS approved to deliver medications to patient room=signed  [facility]

## 2025-02-21 ENCOUNTER — TELEPHONE (OUTPATIENT)
Dept: BARIATRICS/WEIGHT MGMT | Age: 55
End: 2025-02-21

## 2025-02-21 NOTE — TELEPHONE ENCOUNTER
S/P gastric sleeve 2/17/2025 with Dr. Houston    Pt called in with c/o vertigo.  States she has experienced this in the past, but was many years ago.  Pt is reaching fluid goal of 48-64 oz per day and able to tolerate 2 protein shakes daily.  Denies history of high blood pressure.  When asked what helped with her vertigo in the past, she was unable to remember.    Discussed safety issues and fall prevention.  Pt asking what she can do to help with the vertigo.    Advised pt to call her PCP. States she would rather be treated at home as she does not want to go to ER as she is afraid to be around others with illnesses.

## 2025-02-21 NOTE — TELEPHONE ENCOUNTER
As this is a past problem agree with follow up with her PCP. I did try and review her medication history to see if there was any treatment for Vertigo in the past and did not see any of the usual medications so unsure. She is achieving her post-op goals related to fluids and protein.  Thank you,  Gen Oates, JOAO-C

## 2025-02-24 ENCOUNTER — TELEPHONE (OUTPATIENT)
Dept: BARIATRICS/WEIGHT MGMT | Age: 55
End: 2025-02-24

## 2025-02-24 NOTE — TELEPHONE ENCOUNTER
Surgery Type: Laparoscopic Sleeve Gastrectomy     Surgery Date: 2/17/2025    Surgeon: Dr. Houston    The patient was contacted to follow up on their recent bariatric surgery. The following topics were reviewed:    [x] Hydration is Adequate 48-64 oz consistently          [x] Patient is getting at least 48-64 oz of fluids a day, not including protein shakes.    [x]Consuming Adequate Protein         [x] Consuming 2 protein shakes a day         [x] Consuming equal to 60-80 grams of protein a day    Mixing protein powder with 8 ounces of  1% milk .    [x] Food intake is  appropriate carrots, pears, peas, (using prepared baby food)  [x] Adequately pureeing foods, so that there are no chunks left.  [x] Taking in 1-2 oz at a time  [x] Pt is eating 1 times per day  [x] Following the 30-30-30 rule.   - Patient is eating pureed food over 30 minute timeframe.    - Patient is  fluids out from food by 30 minutes.   [x] Multivitamin capsule started  [x] Reminded patient to keep food diary to bring to their 2 week follow up appointment.     [] Pain relief techniques utilized  [x] Taking pain medication as prescribed  [] Utilizing Lidoderm patches (if prescribed)  [] Taking Tylenol instead of prescription pain medication  [x] Wearing abdominal binder  [x] Using ice for incisional pain    [x] Activity is appropriate  [x] Walking 10 minutes out of every hour  [x] Avoiding heavy lifting (>10lbs)  [x] Utilizing their incentive spirometer   [x]  is using 10 times per hour while awake    [] Issues with Nausea/Vomiting/Reflux  [] Using Zofran PRN for nausea/vomiting   [] Taking Prilosec for reflux- does not have Rx, needs a refill.    [] Issues with Constipation         [x] Pt has had a BM since surgery              [x] Pt does not feel constipated  [] Tried Colace  [] Tried Miralax    All questions and concerns addressed including pt had called last week with c/o vertigo.  Her PCP had her take OTC meclizine and pt had good

## 2025-02-24 NOTE — TELEPHONE ENCOUNTER
Looks like patient is doing well with meeting oral intake goals other then using baby food. Agree with recommendations and next follow up can be at her 2 week post-op visit.  Thank you,  TACO DouglasC

## 2025-02-26 DIAGNOSIS — K21.9 CHRONIC GERD: Primary | ICD-10-CM

## 2025-02-26 RX ORDER — OMEPRAZOLE 20 MG/1
20 CAPSULE, DELAYED RELEASE ORAL
Qty: 90 CAPSULE | Refills: 1 | Status: SHIPPED | OUTPATIENT
Start: 2025-02-26

## 2025-03-04 ENCOUNTER — OFFICE VISIT (OUTPATIENT)
Dept: BARIATRICS/WEIGHT MGMT | Age: 55
End: 2025-03-04

## 2025-03-04 VITALS
OXYGEN SATURATION: 95 % | SYSTOLIC BLOOD PRESSURE: 116 MMHG | BODY MASS INDEX: 48.2 KG/M2 | HEART RATE: 80 BPM | RESPIRATION RATE: 16 BRPM | WEIGHT: 272 LBS | HEIGHT: 63 IN | DIASTOLIC BLOOD PRESSURE: 72 MMHG

## 2025-03-04 DIAGNOSIS — Z98.84 S/P LAPAROSCOPIC SLEEVE GASTRECTOMY: Primary | ICD-10-CM

## 2025-03-04 PROCEDURE — 99024 POSTOP FOLLOW-UP VISIT: CPT | Performed by: SURGERY

## 2025-03-04 NOTE — PROGRESS NOTES
Dietary Assessment Note      Vitals:   Vitals:    25 0940   Resp: 16   Height: 1.6 m (5' 3\")    Patient lost 15 lbs over 2 weeks.    Total Weight Loss: 26 lbs    Labs reviewed: labs reviewed.    Protein intake: 60-80 grams/day     Fluid intake: >64 oz/day 60 oz Gatorade Zero + ~3 bottles. Discussed     Multivitamin/mineral intake: yes fusion w/ iron     Calcium intake: no    Other: none    Exercise:  up moving around throughout the day     Nutrition Assessment: 2 week  post-op visit.    2 Protein shakes made w/1% milk  Eating pureed foods 3 times/day: applesauce, mashed carrots w/ SF syrup, CC w/ 1tsp chago sauce    Amount able to eat per sittin/4 cup     Following  rule: yes    Food Intolerances/issues: none    Client Concerns: none    Goals:   - Start phase 3 on 3/10/25    Plan: Follow up at 6 weeks post-op and as needed    Ivanna Read RD

## 2025-03-04 NOTE — PROGRESS NOTES
[facility]   [facility]  Jameson Houston MD, FACS, [address], [address] [address] .  Phone: [phone]  Fax: [phone]       The patient is a 54 y.o. female who returns today for follow up.   Tiff Baez is s/p     Laparoscopic sleeve gastrectomy    We discussed how her weight affects her overall health including:  Patient Active Problem List   Diagnosis    Obstructive sleep apnea    Mixed hyperlipidemia    Chronic GERD    Prediabetes    S/P laparoscopic sleeve gastrectomy    Morbid obesity with BMI of 45.0-49.9, adult        Vitals:    03/04/25 0940   BP: 116/72   Pulse: 80   Resp: 16   SpO2: 95%   Weight: 123.4 kg (272 lb)   Height: 1.6 m (5' 3\")       Lab Results   Component Value Date/Time    WBC 14.5 02/18/2025 04:53 AM    RBC 3.79 02/18/2025 04:53 AM    HGB 11.0 02/18/2025 04:53 AM    HCT 33.3 02/18/2025 04:53 AM    MCV 87.7 02/18/2025 04:53 AM    MCH 28.9 02/18/2025 04:53 AM    MCHC 32.9 02/18/2025 04:53 AM    MPV 8.2 02/18/2025 04:53 AM     Lab Results   Component Value Date/Time     02/18/2025 04:53 AM    K 4.0 02/18/2025 04:53 AM     02/18/2025 04:53 AM    CO2 22 02/18/2025 04:53 AM    ANIONGAP 9 02/18/2025 04:53 AM    GLUCOSE 155 02/18/2025 04:53 AM    BUN 7 02/18/2025 04:53 AM    CREATININE 0.5 02/18/2025 04:53 AM    LABGLOM >90 02/18/2025 04:53 AM    CALCIUM 8.3 02/18/2025 04:53 AM    AGRATIO 1.5 02/17/2025 06:36 AM    BILITOT 0.4 02/17/2025 06:36 AM    ALKPHOS 98 02/17/2025 06:36 AM    ALT 17 02/17/2025 06:36 AM    AST 22 02/17/2025 06:36 AM     No results found for: \"CHOL\", \"TRIG\", \"HDL\"  No components found for: \"TSHREFLEX\"  No results found for: \"IRON\", \"TIBC\"  No results found for: \"BBRCQVZQ70\", \"FOLATE\"  No results found for: \"VITD25\"  No results found for: \"LABA1C\", \"EAG\"     The patient's current Body mass index is 48.18 kg/m². (3/4/25).  Since her last visit she has lost 15 lbs since last visit and total of 26

## 2025-03-31 ENCOUNTER — TELEPHONE (OUTPATIENT)
Dept: BARIATRICS/WEIGHT MGMT | Age: 55
End: 2025-03-31

## 2025-03-31 NOTE — TELEPHONE ENCOUNTER
Pt is s/p sleeve 2/17/2025.     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 She says she will call back in a couple days to reschedule.   Please reach out to patient for dietician call.

## 2025-03-31 NOTE — TELEPHONE ENCOUNTER
Attempted to call number on file X2. Call could not got through, automated message says, \"The person you are trying to reach has calling restrictions.\"

## 2025-04-15 ENCOUNTER — OFFICE VISIT (OUTPATIENT)
Dept: BARIATRICS/WEIGHT MGMT | Age: 55
End: 2025-04-15

## 2025-04-15 VITALS
WEIGHT: 262 LBS | OXYGEN SATURATION: 95 % | RESPIRATION RATE: 16 BRPM | SYSTOLIC BLOOD PRESSURE: 109 MMHG | HEIGHT: 61 IN | DIASTOLIC BLOOD PRESSURE: 77 MMHG | BODY MASS INDEX: 49.47 KG/M2 | HEART RATE: 66 BPM

## 2025-04-15 DIAGNOSIS — Z98.84 S/P LAPAROSCOPIC SLEEVE GASTRECTOMY: Primary | ICD-10-CM

## 2025-04-15 PROCEDURE — 99024 POSTOP FOLLOW-UP VISIT: CPT | Performed by: SURGERY

## 2025-04-15 NOTE — PROGRESS NOTES
Dietary Assessment Note      Vitals:   Vitals:    04/15/25 0815   BP: 109/77   Pulse: 66   Resp: 16   SpO2: 95%   Weight: 118.8 kg (262 lb)   Height: 1.549 m (5' 1\")    Patient lost 10 lbs over 6 weeks.    Total Weight Loss: 36 lbs    Labs reviewed: no lab studies available for review at time of visit    Protein intake: 60-80 grams/day     Fluid intake: 48-64 oz/day water +decaf coffee w/ SF     Multivitamin/mineral intake: yes Fusion w/ iron    Calcium intake: no. Discussed     Other: hair, nail, and skin    Exercise: walking 30-40 min 3-4x/week    Nutrition Assessment: 8 week post-op visit.     Breakfast: protein shake w/ 1% milk   Snack: none  Lunch: CC w/ chago sauce   Snack:  deli turkey + cheese cubes   Dinner: chix w/ chago sauce + mashed potatoes   Snack: 3 tbsp frozen yogurt (discussed)    Amount able to eat per sittin/2 cup    Following  rule: yes     Food Intolerances/issues: none    Client Concerns: Pt reports heartburn. Takes prilosec daily. Discussed reducing tomato based products in diet.    Goals:   - Start Phase 4   - Take 2 calcium soft chews separately from each out     Plan: F/U per provider and as needed.     Ivanna Read RD

## 2025-04-15 NOTE — PATIENT INSTRUCTIONS
Patient received dietary handouts and education.    Goals:   - Start Phase 4   - Take 2 calcium soft chews separately from each out

## 2025-04-15 NOTE — PROGRESS NOTES
[facility]   [facility]  Jameson Houston MD, FACS, [address], [address] [address] .  Phone: [phone]  Fax: [phone]       The patient is a 54 y.o. female who returns today for follow up.   Tiff Baez is s/p     Laparoscopic sleeve gastrectomy    We discussed how her weight affects her overall health including:  Patient Active Problem List   Diagnosis    Obstructive sleep apnea    Mixed hyperlipidemia    Chronic GERD    Prediabetes    S/P laparoscopic sleeve gastrectomy    Morbid obesity with BMI of 45.0-49.9, adult        Vitals:    04/15/25 0815   BP: 109/77   Pulse: 66   Resp: 16   SpO2: 95%   Weight: 118.8 kg (262 lb)   Height: 1.549 m (5' 1\")       Lab Results   Component Value Date/Time    WBC 14.5 02/18/2025 04:53 AM    RBC 3.79 02/18/2025 04:53 AM    HGB 11.0 02/18/2025 04:53 AM    HCT 33.3 02/18/2025 04:53 AM    MCV 87.7 02/18/2025 04:53 AM    MCH 28.9 02/18/2025 04:53 AM    MCHC 32.9 02/18/2025 04:53 AM    MPV 8.2 02/18/2025 04:53 AM     Lab Results   Component Value Date/Time     02/18/2025 04:53 AM    K 4.0 02/18/2025 04:53 AM     02/18/2025 04:53 AM    CO2 22 02/18/2025 04:53 AM    ANIONGAP 9 02/18/2025 04:53 AM    GLUCOSE 155 02/18/2025 04:53 AM    BUN 7 02/18/2025 04:53 AM    CREATININE 0.5 02/18/2025 04:53 AM    LABGLOM >90 02/18/2025 04:53 AM    CALCIUM 8.3 02/18/2025 04:53 AM    AGRATIO 1.5 02/17/2025 06:36 AM    BILITOT 0.4 02/17/2025 06:36 AM    ALKPHOS 98 02/17/2025 06:36 AM    ALT 17 02/17/2025 06:36 AM    AST 22 02/17/2025 06:36 AM     No results found for: \"CHOL\", \"TRIG\", \"HDL\"  No components found for: \"TSHREFLEX\"  No results found for: \"IRON\", \"TIBC\"  No results found for: \"UDOHKJDF96\", \"FOLATE\"  No results found for: \"VITD25\"  No results found for: \"LABA1C\", \"EAG\"     The patient's current Body mass index is 49.5 kg/m². (4/15/25).  Since her last visit she has lost 10 lbs since last visit and total of 36

## 2025-06-10 ENCOUNTER — CLINICAL DOCUMENTATION (OUTPATIENT)
Dept: BARIATRICS/WEIGHT MGMT | Age: 55
End: 2025-06-10

## 2025-06-10 NOTE — PROGRESS NOTES
This eval was conducted via phone on 6/10/25 in preparation or their post-surgical visit on 25 with Dr. Houston.     Dietary Assessment Note      Vitals: no new wt to report.    Total Weight Loss: 36 lbs    Labs reviewed: labs are reviewed, up to date and normal    Protein intake: 60-80 grams/day     Fluid intake: 48-64 oz/day 3 bottles water, decaf + sf creamer, CL    Multivitamin/mineral intake: yes fusion    Calcium intake: yes 2 chews     Other: hair,     Exercise: walking 30-40 min 3-4x/week - active with [employer]     Nutrition Assessment: 4 mos post-op visit. Pt getting new dentures today, which will allow her to include crunchier/tougher foods. Discussed adding raw/cooked vegetables or fruit for increased fiber.     B: scrambled egg + grits occasionally  S: CC OR yogurt  L: tuna packet + crackers OR turkey + bonnie   D: hamburger/chx + salsa/marinara + g beans + sweet potato   S: none OR yasso bar     Amount able to eat per sittin/2 cup     Following  rule: yes     Food Intolerances/issues:  [place] lettuce     Client Concerns: none     Goals:   - Continue diet and exercise plan  - Aim to include 1 additional serving of fruit or vegetable for increased fiber    Plan: f/u in 2 mos or per provider    AMANDA LYON, RD

## 2025-07-08 ENCOUNTER — CLINICAL DOCUMENTATION (OUTPATIENT)
Dept: BARIATRICS/WEIGHT MGMT | Age: 55
End: 2025-07-08
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
## 2025-07-15 ENCOUNTER — OFFICE VISIT (OUTPATIENT)
Dept: BARIATRICS/WEIGHT MGMT | Age: 55
End: 2025-07-15
Payer: COMMERCIAL

## 2025-07-15 VITALS
HEART RATE: 90 BPM | DIASTOLIC BLOOD PRESSURE: 67 MMHG | WEIGHT: 256 LBS | BODY MASS INDEX: 48.33 KG/M2 | SYSTOLIC BLOOD PRESSURE: 96 MMHG | RESPIRATION RATE: 18 BRPM | HEIGHT: 61 IN

## 2025-07-15 DIAGNOSIS — R73.03 PREDIABETES: ICD-10-CM

## 2025-07-15 DIAGNOSIS — E78.2 MIXED HYPERLIPIDEMIA: ICD-10-CM

## 2025-07-15 DIAGNOSIS — Z98.84 S/P LAPAROSCOPIC SLEEVE GASTRECTOMY: ICD-10-CM

## 2025-07-15 DIAGNOSIS — K21.9 CHRONIC GERD: ICD-10-CM

## 2025-07-15 DIAGNOSIS — E66.01 MORBID OBESITY WITH BMI OF 45.0-49.9, ADULT (HCC): Primary | ICD-10-CM

## 2025-07-15 DIAGNOSIS — G47.33 OBSTRUCTIVE SLEEP APNEA: ICD-10-CM

## 2025-07-15 PROCEDURE — G8427 DOCREV CUR MEDS BY ELIG CLIN: HCPCS | Performed by: SURGERY

## 2025-07-15 PROCEDURE — 3017F COLORECTAL CA SCREEN DOC REV: CPT | Performed by: SURGERY

## 2025-07-15 PROCEDURE — G8417 CALC BMI ABV UP PARAM F/U: HCPCS | Performed by: SURGERY

## 2025-07-15 PROCEDURE — 1036F TOBACCO NON-USER: CPT | Performed by: SURGERY

## 2025-07-15 PROCEDURE — 99214 OFFICE O/P EST MOD 30 MIN: CPT | Performed by: SURGERY

## 2025-07-15 PROCEDURE — G2211 COMPLEX E/M VISIT ADD ON: HCPCS | Performed by: SURGERY

## 2025-07-15 NOTE — PROGRESS NOTES
[facility]   [facility]  Jameson Houston MD, FACS, [address], [address]    [facility] [address] .  Phone: [phone]  Fax: [phone]            Chief Complaint   Patient presents with    Bariatrics Post Op Follow Up     4mo s/p sleeve 2/17/25           HPI:    Tiff Baez is a very pleasant 54 y.o.  female , Body mass index is 48.37 kg/m².. And multiple medical problems who is presenting for bariatric follow up care.   Tiff is s/p laparoscopic sleeve gastrectomy by me 2/2025. Initial Weight: 298 lbs, Weight Loss: 42 lbs.   Comes today to the clinic without any complaints. Patient denies any nausea, vomiting, fevers, chills, shortness of breath, chest pain, constipation or urinary symptoms. Denies any heartburn nor dysphagia.  Patient informed us today that they are taking the multivitamins as instructed.  Patient denies any tingling, weakness,  numbness nor any neurological symptoms.  Tiff is feeling very well, and is very active. Patient is very pleased with the weight loss and resolution of co-morbid conditions.      Pain Assessment   Denies any abdominal pain     Past Medical History:   Diagnosis Date    Arthritis     Back pain     COPD (chronic obstructive pulmonary disease) (HCC)     Hyperlipidemia     MATEUS on CPAP      Past Surgical History:   Procedure Laterality Date    ABLATION COLPOCLESIS      CARPAL TUNNEL RELEASE Bilateral 2021    SLEEVE GASTRECTOMY N/A 2/17/2025    LAPAROSCOPIC SLEEVE GASTRECTOMY performed by Jameson Houston MD at [facility] OR    TOTAL KNEE ARTHROPLASTY Right 04/03/2024    UPPER GASTROINTESTINAL ENDOSCOPY N/A 10/11/2024    ESOPHAGOGASTRODUODENOSCOPY BIOPSY performed by Jameson Houston MD at [facility] ASC ENDOSCOPY     Family History   Problem Relation Age of Onset    Arthritis Mother     Stroke Mother     Diabetes Father     Elevated Lipids Father     Hypertension Father      Social History     Tobacco Use    Smoking status: Former

## (undated) DEVICE — SOLUTION IRRIG 1000ML 09% SOD CHL USP PIC PLAS CONTAINER

## (undated) DEVICE — SYRINGE MED 10ML LUERLOCK TIP W/O SFTY DISP

## (undated) DEVICE — RELOAD STPL L60MM H1.5-3.6MM REG TISS BLU GRIPPING SURF B

## (undated) DEVICE — SHEARS ENDOSCP HARM 36CM ULTRASONIC CRV TIP UPGRD

## (undated) DEVICE — SOLUTION IRRIG 500ML STRL H2O NONPYROGENIC

## (undated) DEVICE — Device

## (undated) DEVICE — TROCAR: Brand: KII FIOS FIRST ENTRY

## (undated) DEVICE — TRUE CONTENT TO BE POPULATED AS PART OF REBRANDING: Brand: ARGYLE

## (undated) DEVICE — SOLUTION ANTIFOG VIS SYS CLEARIFY LAPSCP

## (undated) DEVICE — LAPAROSCOPIC SCISSORS: Brand: EPIX LAPAROSCOPIC SCISSORS

## (undated) DEVICE — BLANKET WRM W29.9XL79.1IN UP BODY FORC AIR MISTRAL-AIR

## (undated) DEVICE — LAP SLEEVE: Brand: MEDLINE INDUSTRIES, INC.

## (undated) DEVICE — SYRINGE,10ML,TR/FR,MLL,W/RES: Brand: NAMIC

## (undated) DEVICE — TROCAR: Brand: KII OPTICAL ACCESS SYSTEM

## (undated) DEVICE — TUBING, SUCTION, 1/4" X 10', STRAIGHT: Brand: MEDLINE

## (undated) DEVICE — AIR/WATER CLEANING ADAPTER FOR OLYMPUS® GI ENDOSCOPE: Brand: BULLDOG®

## (undated) DEVICE — SINGLE USE AIR/WATER, SUCTION AND BIOPSY VALVES SET: Brand: ORCAPOD™

## (undated) DEVICE — FORCEPS BX L240CM WRK CHN 2.8MM STD CAP W/ NDL MIC MESH

## (undated) DEVICE — CRADLE ANK AND FT ELEV FLAT END POLY FOAM W/ VENT H NEUT

## (undated) DEVICE — BW-412T DISP COMBO CLEANING BRUSH: Brand: SINGLE USE COMBINATION CLEANING BRUSH

## (undated) DEVICE — APPLICATOR PREP 26ML 0.7% IOD POVACRYLEX 74% ISO ALC ST

## (undated) DEVICE — AIR SHEET,LAT,COMFORT GLIDE, BLEND 40X80: Brand: MEDLINE

## (undated) DEVICE — DEVICE SUT SHFT L34CM DIA 10MM 2 JAW LD UNIT ENDOSTCH

## (undated) DEVICE — RELOAD STPL L60MM H1-2.6MM MESENTERY THN TISS WHT 6 ROW

## (undated) DEVICE — LIQUIBAND RAPID ADHESIVE 36/CS 0.8ML: Brand: MEDLINE

## (undated) DEVICE — MOUTHPIECE ENDOSCP L CTRL OPN AND SIDE PORTS DISP

## (undated) DEVICE — STAPLER 60MM POWERED ECHELON 3000 LONG 440MM

## (undated) DEVICE — SPONGE,LAP,4"X18",XR,ST,5/PK,40PK/CS: Brand: MEDLINE INDUSTRIES, INC.

## (undated) DEVICE — TROCARS: Brand: KII® OPTICAL ACCESS SYSTEM

## (undated) DEVICE — GLOVE ORANGE PI 8 1/2   MSG9085

## (undated) DEVICE — ENDOSCOPIC KIT 6X3/16 FT COLON W/ 1.1 OZ 2 GWN W/O BRSH